# Patient Record
Sex: FEMALE | Race: ASIAN | NOT HISPANIC OR LATINO
[De-identification: names, ages, dates, MRNs, and addresses within clinical notes are randomized per-mention and may not be internally consistent; named-entity substitution may affect disease eponyms.]

---

## 2020-04-26 ENCOUNTER — MESSAGE (OUTPATIENT)
Age: 35
End: 2020-04-26

## 2020-05-02 LAB
SARS-COV-2 IGG SERPL IA-ACNC: 0.6 RATIO
SARS-COV-2 IGG SERPL QL IA: NEGATIVE

## 2021-06-07 ENCOUNTER — EMERGENCY (EMERGENCY)
Facility: HOSPITAL | Age: 36
LOS: 0 days | Discharge: HOME | End: 2021-06-07
Attending: EMERGENCY MEDICINE
Payer: COMMERCIAL

## 2021-06-07 VITALS
DIASTOLIC BLOOD PRESSURE: 85 MMHG | OXYGEN SATURATION: 100 % | SYSTOLIC BLOOD PRESSURE: 108 MMHG | TEMPERATURE: 98 F | HEART RATE: 71 BPM | RESPIRATION RATE: 18 BRPM

## 2021-06-07 VITALS
RESPIRATION RATE: 18 BRPM | HEART RATE: 73 BPM | SYSTOLIC BLOOD PRESSURE: 96 MMHG | DIASTOLIC BLOOD PRESSURE: 67 MMHG | TEMPERATURE: 98 F | OXYGEN SATURATION: 100 %

## 2021-06-07 DIAGNOSIS — R55 SYNCOPE AND COLLAPSE: ICD-10-CM

## 2021-06-07 DIAGNOSIS — R42 DIZZINESS AND GIDDINESS: ICD-10-CM

## 2021-06-07 LAB
ADD ON TEST-SPECIMEN IN LAB: SIGNIFICANT CHANGE UP
ALBUMIN SERPL ELPH-MCNC: 3.8 G/DL — SIGNIFICANT CHANGE UP (ref 3.3–5)
ALP SERPL-CCNC: 43 U/L — SIGNIFICANT CHANGE UP (ref 40–120)
ALT FLD-CCNC: 15 U/L — SIGNIFICANT CHANGE UP (ref 12–78)
ANION GAP SERPL CALC-SCNC: 5 MMOL/L — SIGNIFICANT CHANGE UP (ref 5–17)
ANISOCYTOSIS BLD QL: SLIGHT — SIGNIFICANT CHANGE UP
AST SERPL-CCNC: 13 U/L — LOW (ref 15–37)
BASOPHILS # BLD AUTO: 0.03 K/UL — SIGNIFICANT CHANGE UP (ref 0–0.2)
BASOPHILS NFR BLD AUTO: 0.6 % — SIGNIFICANT CHANGE UP (ref 0–2)
BILIRUB SERPL-MCNC: 0.7 MG/DL — SIGNIFICANT CHANGE UP (ref 0.2–1.2)
BUN SERPL-MCNC: 9 MG/DL — SIGNIFICANT CHANGE UP (ref 7–23)
CALCIUM SERPL-MCNC: 8.5 MG/DL — SIGNIFICANT CHANGE UP (ref 8.5–10.1)
CHLORIDE SERPL-SCNC: 108 MMOL/L — SIGNIFICANT CHANGE UP (ref 96–108)
CO2 SERPL-SCNC: 27 MMOL/L — SIGNIFICANT CHANGE UP (ref 22–31)
CREAT SERPL-MCNC: 0.6 MG/DL — SIGNIFICANT CHANGE UP (ref 0.5–1.3)
ELLIPTOCYTES BLD QL SMEAR: SLIGHT — SIGNIFICANT CHANGE UP
EOSINOPHIL # BLD AUTO: 0.05 K/UL — SIGNIFICANT CHANGE UP (ref 0–0.5)
EOSINOPHIL NFR BLD AUTO: 1 % — SIGNIFICANT CHANGE UP (ref 0–6)
GLUCOSE SERPL-MCNC: 138 MG/DL — HIGH (ref 70–99)
HCG SERPL-ACNC: <1 MIU/ML — SIGNIFICANT CHANGE UP
HCT VFR BLD CALC: 37.2 % — SIGNIFICANT CHANGE UP (ref 34.5–45)
HGB BLD-MCNC: 11.2 G/DL — LOW (ref 11.5–15.5)
IMM GRANULOCYTES NFR BLD AUTO: 0.4 % — SIGNIFICANT CHANGE UP (ref 0–1.5)
LYMPHOCYTES # BLD AUTO: 1.81 K/UL — SIGNIFICANT CHANGE UP (ref 1–3.3)
LYMPHOCYTES # BLD AUTO: 35.3 % — SIGNIFICANT CHANGE UP (ref 13–44)
MANUAL SMEAR VERIFICATION: SIGNIFICANT CHANGE UP
MCHC RBC-ENTMCNC: 20.7 PG — LOW (ref 27–34)
MCHC RBC-ENTMCNC: 30.1 GM/DL — LOW (ref 32–36)
MCV RBC AUTO: 68.6 FL — LOW (ref 80–100)
MICROCYTES BLD QL: SIGNIFICANT CHANGE UP
MONOCYTES # BLD AUTO: 0.27 K/UL — SIGNIFICANT CHANGE UP (ref 0–0.9)
MONOCYTES NFR BLD AUTO: 5.3 % — SIGNIFICANT CHANGE UP (ref 2–14)
NEUTROPHILS # BLD AUTO: 2.95 K/UL — SIGNIFICANT CHANGE UP (ref 1.8–7.4)
NEUTROPHILS NFR BLD AUTO: 57.4 % — SIGNIFICANT CHANGE UP (ref 43–77)
OVALOCYTES BLD QL SMEAR: SLIGHT — SIGNIFICANT CHANGE UP
PLAT MORPH BLD: NORMAL — SIGNIFICANT CHANGE UP
PLATELET # BLD AUTO: 219 K/UL — SIGNIFICANT CHANGE UP (ref 150–400)
POIKILOCYTOSIS BLD QL AUTO: SLIGHT — SIGNIFICANT CHANGE UP
POLYCHROMASIA BLD QL SMEAR: SLIGHT — SIGNIFICANT CHANGE UP
POTASSIUM SERPL-MCNC: 3.9 MMOL/L — SIGNIFICANT CHANGE UP (ref 3.5–5.3)
POTASSIUM SERPL-SCNC: 3.9 MMOL/L — SIGNIFICANT CHANGE UP (ref 3.5–5.3)
PROT SERPL-MCNC: 6.9 GM/DL — SIGNIFICANT CHANGE UP (ref 6–8.3)
RBC # BLD: 5.42 M/UL — HIGH (ref 3.8–5.2)
RBC # FLD: 15 % — HIGH (ref 10.3–14.5)
RBC BLD AUTO: ABNORMAL
SCHISTOCYTES BLD QL AUTO: SLIGHT — SIGNIFICANT CHANGE UP
SODIUM SERPL-SCNC: 140 MMOL/L — SIGNIFICANT CHANGE UP (ref 135–145)
TARGETS BLD QL SMEAR: SLIGHT — SIGNIFICANT CHANGE UP
WBC # BLD: 5.13 K/UL — SIGNIFICANT CHANGE UP (ref 3.8–10.5)
WBC # FLD AUTO: 5.13 K/UL — SIGNIFICANT CHANGE UP (ref 3.8–10.5)

## 2021-06-07 PROCEDURE — 36415 COLL VENOUS BLD VENIPUNCTURE: CPT

## 2021-06-07 PROCEDURE — 80053 COMPREHEN METABOLIC PANEL: CPT

## 2021-06-07 PROCEDURE — 93010 ELECTROCARDIOGRAM REPORT: CPT

## 2021-06-07 PROCEDURE — 84702 CHORIONIC GONADOTROPIN TEST: CPT

## 2021-06-07 PROCEDURE — 85025 COMPLETE CBC W/AUTO DIFF WBC: CPT

## 2021-06-07 PROCEDURE — 99283 EMERGENCY DEPT VISIT LOW MDM: CPT

## 2021-06-07 PROCEDURE — 99284 EMERGENCY DEPT VISIT MOD MDM: CPT

## 2021-06-07 PROCEDURE — 93005 ELECTROCARDIOGRAM TRACING: CPT

## 2021-06-07 NOTE — ED ADULT NURSE NOTE - OBJECTIVE STATEMENT
pt. c/o syncope s/p donating blood pt. states she did not eat lunch and when she started to feel dizzy after giving blood she tried to go back to go get food/snacks but must have passed out in hallway, +LOC, -head pain -neck pain. pt. denies blood thinners

## 2021-06-07 NOTE — ED PROVIDER NOTE - CLINICAL SUMMARY MEDICAL DECISION MAKING FREE TEXT BOX
Pts sx seem in line with vasovagal syncope. Non dangerous etiology. Ensure no evidence of arrythmia. Ensure no evidence of anemia. Pts sx seem in line with vasovagal syncope.  Had noxious stimuli and stereotyped prodrome.  Has had prior syncopal episodes.  Unsure if she hit her head, but is without pain to scalp and no visible e/o trauma.  Fall would have been from standing height in any event and [place] CTH and C spine rules recommend against imaging.  No e/o dangerous etiology.  Will ensure no evidence of arrythmia.

## 2021-06-07 NOTE — ED PROVIDER NOTE - OBJECTIVE STATEMENT
34 y/o F with no significant PMHx was  giving blood. Pt was drinking juice when she got up and passed out in hallway. Pt feels lightheaded. Denies headache, CP, SOB, fever or chills. Pt does states she has had prior syncopal episodes'.

## 2021-06-07 NOTE — ED PROVIDER NOTE - PATIENT PORTAL LINK FT
You can access the FollowMyHealth Patient Portal offered by [facility] by registering at the following website: [email or web address]. By joining [employer]’s FollowMyHealth portal, you will also be able to view your health information using other applications (apps) compatible with our system.

## 2021-06-07 NOTE — ED ADULT NURSE NOTE - NSIMPLEMENTINTERV_GEN_ALL_ED
Implemented All Universal Safety Interventions:  [place] to call system. Call bell, personal items and telephone within reach. Instruct patient to call for assistance. Room bathroom lighting operational. Non-slip footwear when patient is off stretcher. Physically safe environment: no spills, clutter or unnecessary equipment. Stretcher in lowest position, wheels locked, appropriate side rails in place.

## 2021-06-07 NOTE — ED PROVIDER NOTE - PROGRESS NOTE DETAILS
Labs unremarkable.  EKG unremarkable.  Pt well appearing.  Not pregnant.  Stable for d/c home.  D/c home with strict return precautions and prompt outpatient f/u.

## 2021-06-07 NOTE — ED ADULT TRIAGE NOTE - CHIEF COMPLAINT QUOTE
pt a/ox3, reports "donating blood at blood [place] and having a syncopal episode while walking in the hallway s/p blood draw". +HS +LOC. pt denies blood thinners.

## 2021-11-28 NOTE — ED ADULT TRIAGE NOTE - DIRECT TO ROOM CARE INITIATED:
07-Jun-2021 13:07
[facility] ([facility])  Rehabilitation  [facility] [address] [place] [address]  Phone: [phone]  Fax:

## 2022-08-02 ENCOUNTER — NON-APPOINTMENT (OUTPATIENT)
Age: 37
End: 2022-08-02

## 2022-09-30 PROBLEM — Z78.9 OTHER SPECIFIED HEALTH STATUS: Chronic | Status: ACTIVE | Noted: 2021-06-07

## 2022-10-17 ENCOUNTER — NON-APPOINTMENT (OUTPATIENT)
Age: 37
End: 2022-10-17

## 2022-10-17 ENCOUNTER — APPOINTMENT (OUTPATIENT)
Dept: INTERNAL MEDICINE | Facility: CLINIC | Age: 37
End: 2022-10-17

## 2022-10-17 VITALS
TEMPERATURE: 97.3 F | OXYGEN SATURATION: 97 % | BODY MASS INDEX: 20.2 KG/M2 | SYSTOLIC BLOOD PRESSURE: 110 MMHG | RESPIRATION RATE: 14 BRPM | DIASTOLIC BLOOD PRESSURE: 80 MMHG | HEART RATE: 67 BPM | WEIGHT: 114 LBS | HEIGHT: 63 IN

## 2022-10-17 DIAGNOSIS — Z80.3 FAMILY HISTORY OF MALIGNANT NEOPLASM OF BREAST: ICD-10-CM

## 2022-10-17 DIAGNOSIS — Z82.49 FAMILY HISTORY OF ISCHEMIC HEART DISEASE AND OTHER DISEASES OF THE CIRCULATORY SYSTEM: ICD-10-CM

## 2022-10-17 DIAGNOSIS — Z78.9 OTHER SPECIFIED HEALTH STATUS: ICD-10-CM

## 2022-10-17 PROCEDURE — 99385 PREV VISIT NEW AGE 18-39: CPT

## 2022-10-17 NOTE — PLAN
[FreeTextEntry1] : Health Maintenance\par - will obtain labwork CBC, CMP, lipid panel, thyroid, vitamin B12, D, folate\par - patient has not had previous pap smear\par - patient will see gynecologist\par - follows with dentist regularly\par - received flu vaccine, COVID vaccines x3 \par \par

## 2022-10-17 NOTE — HISTORY OF PRESENT ILLNESS
[FreeTextEntry1] : New patient physical exam [de-identified] : The patient is a 38 y/o F with no significant past medical history who presents to establish care and for an annual physical exam. The patient states she and her  are trying to conceive a child, does not follow with gyn. Has been trying to conceive x 3 months. Taking prenatal vitamin and fiber supplements. No previous pap smears. \par LNMP 2 weeks ago, normal per patient.

## 2022-10-17 NOTE — HEALTH RISK ASSESSMENT
[Very Good] : ~his/her~  mood as very good [Never] : Never [Yes] : Yes [Monthly or less (1 pt)] : Monthly or less (1 point) [1 or 2 (0 pts)] : 1 or 2 (0 points) [Never (0 pts)] : Never (0 points) [0] : 2) Feeling down, depressed, or hopeless: Not at all (0) [PHQ-2 Negative - No further assessment needed] : PHQ-2 Negative - No further assessment needed [With Significant Other] : lives with significant other [Employed] : employed [Feels Safe at Home] : Feels safe at home [Fully functional (bathing, dressing, toileting, transferring, walking, feeding)] : Fully functional (bathing, dressing, toileting, transferring, walking, feeding) [Fully functional (using the telephone, shopping, preparing meals, housekeeping, doing laundry, using] : Fully functional and needs no help or supervision to perform IADLs (using the telephone, shopping, preparing meals, housekeeping, doing laundry, using transportation, managing medications and managing finances) [[id number]] : 0 [Reports changes in hearing] : Reports no changes in hearing [Reports changes in vision] : Reports no changes in vision [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

## 2022-10-18 ENCOUNTER — NON-APPOINTMENT (OUTPATIENT)
Age: 37
End: 2022-10-18

## 2022-10-18 ENCOUNTER — TRANSCRIPTION ENCOUNTER (OUTPATIENT)
Age: 37
End: 2022-10-18

## 2022-10-21 LAB
25(OH)D3 SERPL-MCNC: 20.8 NG/ML
ALBUMIN SERPL ELPH-MCNC: 4.5 G/DL
ALP BLD-CCNC: 61 U/L
ALT SERPL-CCNC: 11 U/L
ANION GAP SERPL CALC-SCNC: 12 MMOL/L
AST SERPL-CCNC: 13 U/L
BASOPHILS # BLD AUTO: 0.05 K/UL
BASOPHILS NFR BLD AUTO: 0.9 %
BILIRUB SERPL-MCNC: 0.4 MG/DL
BUN SERPL-MCNC: 11 MG/DL
CALCIUM SERPL-MCNC: 9.3 MG/DL
CHLORIDE SERPL-SCNC: 103 MMOL/L
CHOLEST SERPL-MCNC: 152 MG/DL
CO2 SERPL-SCNC: 26 MMOL/L
CREAT SERPL-MCNC: 0.66 MG/DL
EGFR: 117 ML/MIN/1.73M2
EOSINOPHIL # BLD AUTO: 0.1 K/UL
EOSINOPHIL NFR BLD AUTO: 1.9 %
ESTIMATED AVERAGE GLUCOSE: 114 MG/DL
FOLATE SERPL-MCNC: 11.5 NG/ML
GLUCOSE SERPL-MCNC: 83 MG/DL
HBA1C MFR BLD HPLC: 5.6 %
HCT VFR BLD CALC: 40.3 %
HDLC SERPL-MCNC: 77 MG/DL
HGB BLD-MCNC: 12.2 G/DL
IMM GRANULOCYTES NFR BLD AUTO: 0.2 %
LDLC SERPL CALC-MCNC: 67 MG/DL
LYMPHOCYTES # BLD AUTO: 1.64 K/UL
LYMPHOCYTES NFR BLD AUTO: 30.6 %
MAN DIFF?: NORMAL
MCHC RBC-ENTMCNC: 21.2 PG
MCHC RBC-ENTMCNC: 30.3 GM/DL
MCV RBC AUTO: 70.1 FL
MONOCYTES # BLD AUTO: 0.38 K/UL
MONOCYTES NFR BLD AUTO: 7.1 %
NEUTROPHILS # BLD AUTO: 3.18 K/UL
NEUTROPHILS NFR BLD AUTO: 59.3 %
NONHDLC SERPL-MCNC: 75 MG/DL
PLATELET # BLD AUTO: 315 K/UL
POTASSIUM SERPL-SCNC: 4.1 MMOL/L
PROT SERPL-MCNC: 7.1 G/DL
RBC # BLD: 5.75 M/UL
RBC # FLD: 16.1 %
SODIUM SERPL-SCNC: 141 MMOL/L
TRIGL SERPL-MCNC: 43 MG/DL
TSH SERPL-ACNC: 1.13 UIU/ML
VIT B12 SERPL-MCNC: 627 PG/ML
WBC # FLD AUTO: 5.36 K/UL

## 2022-12-07 ENCOUNTER — APPOINTMENT (OUTPATIENT)
Dept: OBGYN | Facility: CLINIC | Age: 37
End: 2022-12-07

## 2022-12-13 ENCOUNTER — APPOINTMENT (OUTPATIENT)
Dept: OBGYN | Facility: CLINIC | Age: 37
End: 2022-12-13

## 2022-12-13 PROCEDURE — 81025 URINE PREGNANCY TEST: CPT

## 2022-12-13 PROCEDURE — 81002 URINALYSIS NONAUTO W/O SCOPE: CPT

## 2022-12-13 PROCEDURE — 36415 COLL VENOUS BLD VENIPUNCTURE: CPT

## 2022-12-13 PROCEDURE — 76856 US EXAM PELVIC COMPLETE: CPT | Mod: 59

## 2022-12-13 PROCEDURE — 99385 PREV VISIT NEW AGE 18-39: CPT | Mod: 25

## 2022-12-13 PROCEDURE — 76830 TRANSVAGINAL US NON-OB: CPT

## 2022-12-29 ENCOUNTER — NON-APPOINTMENT (OUTPATIENT)
Age: 37
End: 2022-12-29

## 2023-01-03 ENCOUNTER — APPOINTMENT (OUTPATIENT)
Dept: OBGYN | Facility: CLINIC | Age: 38
End: 2023-01-03
Payer: COMMERCIAL

## 2023-01-03 PROCEDURE — 76830 TRANSVAGINAL US NON-OB: CPT

## 2023-01-03 PROCEDURE — 81025 URINE PREGNANCY TEST: CPT

## 2023-01-03 PROCEDURE — 99214 OFFICE O/P EST MOD 30 MIN: CPT

## 2023-01-03 PROCEDURE — 81002 URINALYSIS NONAUTO W/O SCOPE: CPT

## 2023-01-03 PROCEDURE — 36415 COLL VENOUS BLD VENIPUNCTURE: CPT

## 2023-01-20 ENCOUNTER — APPOINTMENT (OUTPATIENT)
Dept: OBGYN | Facility: CLINIC | Age: 38
End: 2023-01-20

## 2023-01-25 ENCOUNTER — EMERGENCY (EMERGENCY)
Facility: HOSPITAL | Age: 38
LOS: 0 days | Discharge: HOME | End: 2023-01-25
Attending: STUDENT IN AN ORGANIZED HEALTH CARE EDUCATION/TRAINING PROGRAM
Payer: COMMERCIAL

## 2023-01-25 ENCOUNTER — APPOINTMENT (OUTPATIENT)
Dept: ANTEPARTUM | Facility: CLINIC | Age: 38
End: 2023-01-25
Payer: COMMERCIAL

## 2023-01-25 ENCOUNTER — ASOB RESULT (OUTPATIENT)
Age: 38
End: 2023-01-25

## 2023-01-25 VITALS — HEIGHT: 63 IN | WEIGHT: 113.98 LBS

## 2023-01-25 VITALS
HEART RATE: 84 BPM | RESPIRATION RATE: 15 BRPM | TEMPERATURE: 98 F | SYSTOLIC BLOOD PRESSURE: 126 MMHG | DIASTOLIC BLOOD PRESSURE: 85 MMHG | OXYGEN SATURATION: 99 %

## 2023-01-25 LAB
ALBUMIN SERPL ELPH-MCNC: 3.5 G/DL — SIGNIFICANT CHANGE UP (ref 3.3–5)
ALP SERPL-CCNC: 42 U/L — SIGNIFICANT CHANGE UP (ref 40–120)
ALT FLD-CCNC: 14 U/L — SIGNIFICANT CHANGE UP (ref 12–78)
ANION GAP SERPL CALC-SCNC: 5 MMOL/L — SIGNIFICANT CHANGE UP (ref 5–17)
APPEARANCE UR: CLEAR — SIGNIFICANT CHANGE UP
AST SERPL-CCNC: 14 U/L — LOW (ref 15–37)
BASOPHILS # BLD AUTO: 0.03 K/UL — SIGNIFICANT CHANGE UP (ref 0–0.2)
BASOPHILS NFR BLD AUTO: 0.4 % — SIGNIFICANT CHANGE UP (ref 0–2)
BILIRUB SERPL-MCNC: 0.5 MG/DL — SIGNIFICANT CHANGE UP (ref 0.2–1.2)
BILIRUB UR-MCNC: NEGATIVE — SIGNIFICANT CHANGE UP
BUN SERPL-MCNC: 11 MG/DL — SIGNIFICANT CHANGE UP (ref 7–23)
CALCIUM SERPL-MCNC: 9.1 MG/DL — SIGNIFICANT CHANGE UP (ref 8.5–10.1)
CHLORIDE SERPL-SCNC: 107 MMOL/L — SIGNIFICANT CHANGE UP (ref 96–108)
CO2 SERPL-SCNC: 24 MMOL/L — SIGNIFICANT CHANGE UP (ref 22–31)
COLOR SPEC: YELLOW — SIGNIFICANT CHANGE UP
CREAT SERPL-MCNC: 0.69 MG/DL — SIGNIFICANT CHANGE UP (ref 0.5–1.3)
DIFF PNL FLD: ABNORMAL
EGFR: 116 ML/MIN/1.73M2 — SIGNIFICANT CHANGE UP
EOSINOPHIL # BLD AUTO: 0.03 K/UL — SIGNIFICANT CHANGE UP (ref 0–0.5)
EOSINOPHIL NFR BLD AUTO: 0.4 % — SIGNIFICANT CHANGE UP (ref 0–6)
GLUCOSE SERPL-MCNC: 95 MG/DL — SIGNIFICANT CHANGE UP (ref 70–99)
GLUCOSE UR QL: NEGATIVE — SIGNIFICANT CHANGE UP
HCG SERPL-ACNC: HIGH MIU/ML
HCT VFR BLD CALC: 38.1 % — SIGNIFICANT CHANGE UP (ref 34.5–45)
HGB BLD-MCNC: 12.3 G/DL — SIGNIFICANT CHANGE UP (ref 11.5–15.5)
IMM GRANULOCYTES NFR BLD AUTO: 0.1 % — SIGNIFICANT CHANGE UP (ref 0–0.9)
KETONES UR-MCNC: NEGATIVE — SIGNIFICANT CHANGE UP
LEUKOCYTE ESTERASE UR-ACNC: ABNORMAL
LYMPHOCYTES # BLD AUTO: 1.33 K/UL — SIGNIFICANT CHANGE UP (ref 1–3.3)
LYMPHOCYTES # BLD AUTO: 19.2 % — SIGNIFICANT CHANGE UP (ref 13–44)
MCHC RBC-ENTMCNC: 21.7 PG — LOW (ref 27–34)
MCHC RBC-ENTMCNC: 32.3 GM/DL — SIGNIFICANT CHANGE UP (ref 32–36)
MCV RBC AUTO: 67.1 FL — LOW (ref 80–100)
MONOCYTES # BLD AUTO: 0.46 K/UL — SIGNIFICANT CHANGE UP (ref 0–0.9)
MONOCYTES NFR BLD AUTO: 6.7 % — SIGNIFICANT CHANGE UP (ref 2–14)
NEUTROPHILS # BLD AUTO: 5.05 K/UL — SIGNIFICANT CHANGE UP (ref 1.8–7.4)
NEUTROPHILS NFR BLD AUTO: 73.2 % — SIGNIFICANT CHANGE UP (ref 43–77)
NITRITE UR-MCNC: NEGATIVE — SIGNIFICANT CHANGE UP
PH UR: 6.5 — SIGNIFICANT CHANGE UP (ref 5–8)
PLATELET # BLD AUTO: 241 K/UL — SIGNIFICANT CHANGE UP (ref 150–400)
POTASSIUM SERPL-MCNC: 3.5 MMOL/L — SIGNIFICANT CHANGE UP (ref 3.5–5.3)
POTASSIUM SERPL-SCNC: 3.5 MMOL/L — SIGNIFICANT CHANGE UP (ref 3.5–5.3)
PROT SERPL-MCNC: 7.4 GM/DL — SIGNIFICANT CHANGE UP (ref 6–8.3)
PROT UR-MCNC: NEGATIVE — SIGNIFICANT CHANGE UP
RBC # BLD: 5.68 M/UL — HIGH (ref 3.8–5.2)
RBC # FLD: 15.6 % — HIGH (ref 10.3–14.5)
SODIUM SERPL-SCNC: 136 MMOL/L — SIGNIFICANT CHANGE UP (ref 135–145)
SP GR SPEC: 1.01 — SIGNIFICANT CHANGE UP (ref 1.01–1.02)
UROBILINOGEN FLD QL: NEGATIVE — SIGNIFICANT CHANGE UP
WBC # BLD: 6.91 K/UL — SIGNIFICANT CHANGE UP (ref 3.8–10.5)
WBC # FLD AUTO: 6.91 K/UL — SIGNIFICANT CHANGE UP (ref 3.8–10.5)

## 2023-01-25 PROCEDURE — 86850 RBC ANTIBODY SCREEN: CPT

## 2023-01-25 PROCEDURE — 76802 OB US < 14 WKS ADDL FETUS: CPT

## 2023-01-25 PROCEDURE — 99213 OFFICE O/P EST LOW 20 MIN: CPT

## 2023-01-25 PROCEDURE — 99284 EMERGENCY DEPT VISIT MOD MDM: CPT

## 2023-01-25 PROCEDURE — 86900 BLOOD TYPING SEROLOGIC ABO: CPT

## 2023-01-25 PROCEDURE — 36415 COLL VENOUS BLD VENIPUNCTURE: CPT

## 2023-01-25 PROCEDURE — 0502F SUBSEQUENT PRENATAL CARE: CPT

## 2023-01-25 PROCEDURE — 80053 COMPREHEN METABOLIC PANEL: CPT

## 2023-01-25 PROCEDURE — 86901 BLOOD TYPING SEROLOGIC RH(D): CPT

## 2023-01-25 PROCEDURE — 81002 URINALYSIS NONAUTO W/O SCOPE: CPT

## 2023-01-25 PROCEDURE — 85025 COMPLETE CBC W/AUTO DIFF WBC: CPT

## 2023-01-25 PROCEDURE — 76801 OB US < 14 WKS SINGLE FETUS: CPT | Mod: 26

## 2023-01-25 PROCEDURE — 87086 URINE CULTURE/COLONY COUNT: CPT

## 2023-01-25 PROCEDURE — 76813 OB US NUCHAL MEAS 1 GEST: CPT

## 2023-01-25 PROCEDURE — 76801 OB US < 14 WKS SINGLE FETUS: CPT

## 2023-01-25 PROCEDURE — 99284 EMERGENCY DEPT VISIT MOD MDM: CPT | Mod: 25

## 2023-01-25 PROCEDURE — 81001 URINALYSIS AUTO W/SCOPE: CPT

## 2023-01-25 PROCEDURE — 84702 CHORIONIC GONADOTROPIN TEST: CPT

## 2023-01-25 NOTE — ED ADULT TRIAGE NOTE - ESI TRIAGE ACUITY LEVEL, MLM
3

## 2023-01-25 NOTE — ED PROVIDER NOTE - OBJECTIVE STATEMENT
37-year-old female G1, P0 presents the ED with vaginal bleeding that started this morning.  Patient estimates approximately 30 cc of blood came out with no clots.  Denied abdominal pain or cramping.  Was in a normal state of health prior no abdominal trauma no fevers no chills.  Patient had ultrasound with her OB/GYN doctor last week which showed normal pregnancy with normal heartbeat.  Pregnancy was in the correct location.  She is due for her 12-week scan today.  Denies past medical history.  Took MiraLAX yesterday.

## 2023-01-25 NOTE — ED PROVIDER NOTE - CLINICAL SUMMARY MEDICAL DECISION MAKING FREE TEXT BOX
Adult female first pregnancy 12 weeks and with known pregnancy in the correct location based on previous ultrasound presenting with 1 day of vaginal bleeding.  Vitals are normal.  No abdominal pain.  On exam well-appearing.  Plan for transvaginal ultrasound type and screen H&H check and reassessment.  Anticipate discharge with OB/GYN follow-up depending on results of ultrasound and blood work.

## 2023-01-25 NOTE — ED ADULT NURSE NOTE - CHIEF COMPLAINT QUOTE
Pt presents to the ED c/o vaginal bleeding x15 minutes. Pt is , 12 weeks pregnant. Denies abdominal cramping. OBGYJOSUE Rosenberg at [place].

## 2023-01-25 NOTE — ED ADULT NURSE REASSESSMENT NOTE - NS ED NURSE REASSESS COMMENT FT1
Pt is A&O4, pt able to ambulate safely and steadily w/out assistance, denies dizziness/weakness upon standing, pt discharged home and paperwork was signed, reviewed with patient. Follow up treatment and plan for discharge was reviewed with the patient. Vital Signs recorded in the EMR. Pt education deemed successful at time of discharge after teach back proves proficiency. Pt has no distress at time of discharge, pt provided discharge instruction paperwork.

## 2023-01-25 NOTE — ED PROVIDER NOTE - PATIENT PORTAL LINK FT
You can access the FollowMyHealth Patient Portal offered by [facility] by registering at the following website: [email or web address]. By joining [employer]’s FollowMyHealth portal, you will also be able to view your health information using other applications (apps) compatible with our system.

## 2023-01-25 NOTE — ED ADULT TRIAGE NOTE - CHIEF COMPLAINT QUOTE
Pt presents to the ED c/o vaginal bleeding x15 minutes. Pt is , 12 weeks pregnant. Denies abdominal cramping. OBGYJOSUE Rosenberg at [place].

## 2023-01-25 NOTE — ED ADULT NURSE NOTE - OBJECTIVE STATEMENT
Pt is a 37YOF who is here for vaginal bleeding, pt states she was at work when she started to feel bleeding and when she looked it was approximately 20-30cc of bright red blood, pt states she is 12 weeks pregnant, pt is A0, pt states she has no pain, discomfort, pt has no nausea, vomiting, no changes to her bowel or elimination patterns, pt has no cramping, fevers, chills, pt denies any dizziness, shortness of breath, difficulty breathing, chest pain. Pt states pregnancy has been normal up until today.

## 2023-01-25 NOTE — ED PROVIDER NOTE - NSFOLLOWUPINSTRUCTIONS_ED_ALL_ED_FT
Seek immediate medical assistance for any new or worsening symptoms. If you have issues obtaining follow up, please call: [phone] or [phone]  to obtain a doctor or specialist who takes your insurance in your area.       Follow-up with your OB/GYN doctor.    Threatened Miscarriage    WHAT YOU NEED TO KNOW:  A threatened miscarriage occurs when you have vaginal bleeding within the first 20 weeks of pregnancy. It means that a miscarriage may happen. A threatened miscarriage may also be called a threatened .  DISCHARGE INSTRUCTIONS:  Return to the emergency department if:  You feel weak or faint.  Your pain or cramping in your abdomen or back gets worse.  You have vaginal bleeding that soaks 1 or more pads in an hour.  You pass material that looks like tissue or large clots.  Contact your healthcare provider or obstetrician if:  You have a fever.  You have trouble urinating, burning when you urinate, or feel a need to urinate often.  You have new or worsening vaginal bleeding.  You have vaginal pain or itching, or vaginal discharge that is yellow, green, or foul-smelling.  You have questions or concerns about your condition or care.  Self-care: The following may help you manage your symptoms and decrease your risk for a miscarriage:  Do not put anything in your vagina. Do not have sex, douche, or use tampons. These actions may increase your risk for infection and miscarriage.  Rest as directed. Do not exercise or do strenuous activities. These activities may cause  labor or miscarriage. Ask your healthcare provider what activities are okay to do.  Stay healthy during pregnancy:  Eat a variety of healthy foods. Healthy foods can help you get extra protein, water, and calories that you need while you are pregnant. Healthy foods include fruits, vegetables, whole-grain breads, low-fat dairy products, beans, lean meats, and fish. Avoid raw or undercooked meat and fish. Ask your healthcare provider if you need a special diet.  Take prenatal vitamins as directed. These help you get the right amount of vitamins and minerals. They may also decrease the risk of certain birth defects.  Do not drink alcohol or use illegal drugs. These can increase your risk for a miscarriage or harm your baby.  Do not smoke. Nicotine and other chemicals in cigarettes and cigars can harm your baby and cause miscarriage or  labor. Ask your healthcare provider for information if you currently smoke and need help to quit. E-cigarettes or smokeless tobacco still contain nicotine. Do not use these products.  Decrease your risk for an infection. Always wash your hands before eating or preparing meals. Do not spend time with people who are sick. Ask your healthcare provider if you need immunizations such as the flu or hepatitis B vaccine. Immunizations may decrease your risk for infections that could cause a miscarriage.  Manage your medical conditions. Keep your blood pressure and blood sugars under control. Maintain a healthy weight during pregnancy.  Follow up with your obstetrician as directed: You may need to see your obstetrician frequently for ultrasounds or blood tests. Write down your questions so you remember to ask them during your visits.    Amenaza de aborto natural    LO QUE NECESITA SABER:    Joaquim amenaza de aborto ocurre cuando se tiene sangrado vaginal dentro de las primeras 20 semanas de embarazo. Eso indica que podría ocurrir un aborto natural. Joaquim amenaza de un aborto natural también se le conoce london joaquim amenaza de pérdida del embarazo.    INSTRUCCIONES SOBRE EL BLANCA HOSPITALARIA:  Busque atención médica de inmediato si:  Se siente débil o que se desmaya.  Tiene dolor o cólicos en el abdomen o en la espalda que empeoran.  Tiene sangrado vaginal que en joaquim hora empapa por lo menos 1 toalla sanitaria.  Le sale un material que parece tejido o coágulos grandes.  Comuníquese con das médico u obstetra si:  Tiene fiebre.  Tiene problemas para orinar, siente ardor o la necesidad de orinar con frecuencia.  Tiene sangrado vaginal nuevo o que empeora.  Tiene dolor o comezón vaginal o flujo vaginal de color amarillo o satnam o maloliente.  Usted tiene preguntas o inquietudes acerca de das condición o cuidado.  Cuidados personales:Los siguientes podrían ayudar a controlar los síntomas y disminuir das riesgo de un aborto natural:  No se ponga nada dentro de das vagina.No tenga relaciones sexuales, no tome duchas vaginales ni use tampones. Estas acciones pueden aumentar das riesgo de joaquim infección o de un aborto natural.  Repose según le indicaron.No practique ningún ejercicio ni actividades vigorosas. Estas actividades pueden causar un parto prematuro o un aborto natural. Pregunte a das médico cuáles actividades físicas son las apropiadas para usted.  Manténgase saludable idris el embarazo:  Consuma alimentos saludables y variados.Los alimentos sanos pueden ayudarla a obtener las proteínas y calorías adicionales que usted necesita idris el embarazo. Los alimentos saludables incluyen frutas, verduras, pan integral, productos lácteos bajos en grasa, frijoles, johana magras y pescado. Evite la carne y pescado crudos o que no estén cocinados completamente. Consulte con das médico en abhi que sea necesario que siga joaquim dieta especial.  Camas vitaminas prenatales según las indicaciones.Estas ayudan a obtener la cantidad correcta de vitaminas y minerales. También podrían ayudar a disminuir el riesgo de ciertos defectos de nacimiento.  No consuma alcohol ni drogas ilegales.Estos pueden aumentar el riesgo de un aborto espontáneo o perjudicar al bebé.  No fume.La nicotina y otros químicos en los cigarrillos y cigarros pueden perjudicar a das bebé y provocar un aborto natural o un parto prematuro. Pida información a das médico si usted actualmente fuma y necesita ayuda para dejar de fumar. Los cigarrillos electrónicos o el tabaco sin humo igualmente contienen nicotina. No use estos productos.  Disminuya el riesgo de joaquim infección.Siempre lave mariaelena maira antes de comer o preparar alimentos. No pase tiempo con gente que está enferma. Pregúntele a das médico si necesita vacunas london la vacuna contra la gripe o la hepatitis B. Las vacunas pueden disminuir das riesgo de contraer infecciones que pueden causar un aborto espontáneo.  Controle mariaelena afecciones médicas.Mantenga bajo control das presión arterial y azúcar en la vance. Mantenga un peso saludable idris el embarazo.  Programe joaquim lacey con das obstétrico london se le indique:Es posible que usted deba acudir a consulta con das ginecólogo frecuentemente para que le ordene ecografías o más exámenes de vance. Anote mariaelena preguntas para que se acuerde de hacerlas idris mariaelena visitas. Seek immediate medical assistance for any new or worsening symptoms. If you have issues obtaining follow up, please call: [phone] or [phone]  to obtain a doctor or specialist who takes your insurance in your area.     Follow-up with your OB/GYN doctor.    Threatened Miscarriage    WHAT YOU NEED TO KNOW:  A threatened miscarriage occurs when you have vaginal bleeding within the first 20 weeks of pregnancy. It means that a miscarriage may happen. A threatened miscarriage may also be called a threatened .  DISCHARGE INSTRUCTIONS:  Return to the emergency department if:  You feel weak or faint.  Your pain or cramping in your abdomen or back gets worse.  You have vaginal bleeding that soaks 1 or more pads in an hour.  You pass material that looks like tissue or large clots.  Contact your healthcare provider or obstetrician if:  You have a fever.  You have trouble urinating, burning when you urinate, or feel a need to urinate often.  You have new or worsening vaginal bleeding.  You have vaginal pain or itching, or vaginal discharge that is yellow, green, or foul-smelling.  You have questions or concerns about your condition or care.  Self-care: The following may help you manage your symptoms and decrease your risk for a miscarriage:  Do not put anything in your vagina. Do not have sex, douche, or use tampons. These actions may increase your risk for infection and miscarriage.  Rest as directed. Do not exercise or do strenuous activities. These activities may cause  labor or miscarriage. Ask your healthcare provider what activities are okay to do.  Stay healthy during pregnancy:  Eat a variety of healthy foods. Healthy foods can help you get extra protein, water, and calories that you need while you are pregnant. Healthy foods include fruits, vegetables, whole-grain breads, low-fat dairy products, beans, lean meats, and fish. Avoid raw or undercooked meat and fish. Ask your healthcare provider if you need a special diet.  Take prenatal vitamins as directed. These help you get the right amount of vitamins and minerals. They may also decrease the risk of certain birth defects.  Do not drink alcohol or use illegal drugs. These can increase your risk for a miscarriage or harm your baby.  Do not smoke. Nicotine and other chemicals in cigarettes and cigars can harm your baby and cause miscarriage or  labor. Ask your healthcare provider for information if you currently smoke and need help to quit. E-cigarettes or smokeless tobacco still contain nicotine. Do not use these products.  Decrease your risk for an infection. Always wash your hands before eating or preparing meals. Do not spend time with people who are sick. Ask your healthcare provider if you need immunizations such as the flu or hepatitis B vaccine. Immunizations may decrease your risk for infections that could cause a miscarriage.  Manage your medical conditions. Keep your blood pressure and blood sugars under control. Maintain a healthy weight during pregnancy.  Follow up with your obstetrician as directed: You may need to see your obstetrician frequently for ultrasounds or blood tests. Write down your questions so you remember to ask them during your visits.    Amenaza de aborto natural    LO QUE NECESITA SABER:    Joaquim amenaza de aborto ocurre cuando se tiene sangrado vaginal dentro de las primeras 20 semanas de embarazo. Eso indica que podría ocurrir un aborto natural. Joaquim amenaza de un aborto natural también se le conoce london joaquim amenaza de pérdida del embarazo.    INSTRUCCIONES SOBRE EL BLANCA HOSPITALARIA:  Busque atención médica de inmediato si:  Se siente débil o que se desmaya.  Tiene dolor o cólicos en el abdomen o en la espalda que empeoran.  Tiene sangrado vaginal que en joaquim hora empapa por lo menos 1 toalla sanitaria.  Le sale un material que parece tejido o coágulos grandes.  Comuníquese con das médico u obstetra si:  Tiene fiebre.  Tiene problemas para orinar, siente ardor o la necesidad de orinar con frecuencia.  Tiene sangrado vaginal nuevo o que empeora.  Tiene dolor o comezón vaginal o flujo vaginal de color amarillo o satnam o maloliente.  Usted tiene preguntas o inquietudes acerca de das condición o cuidado.  Cuidados personales:Los siguientes podrían ayudar a controlar los síntomas y disminuir das riesgo de un aborto natural:  No se ponga nada dentro de das vagina.No tenga relaciones sexuales, no tome duchas vaginales ni use tampones. Estas acciones pueden aumentar das riesgo de joaquim infección o de un aborto natural.  Repose según le indicaron.No practique ningún ejercicio ni actividades vigorosas. Estas actividades pueden causar un parto prematuro o un aborto natural. Pregunte a das médico cuáles actividades físicas son las apropiadas para usted.  Manténgase saludable idris el embarazo:  Consuma alimentos saludables y variados.Los alimentos sanos pueden ayudarla a obtener las proteínas y calorías adicionales que usted necesita idris el embarazo. Los alimentos saludables incluyen frutas, verduras, pan integral, productos lácteos bajos en grasa, frijoles, johana magras y pescado. Evite la carne y pescado crudos o que no estén cocinados completamente. Consulte con das médico en abhi que sea necesario que siga joaquim dieta especial.  Higden vitaminas prenatales según las indicaciones.Estas ayudan a obtener la cantidad correcta de vitaminas y minerales. También podrían ayudar a disminuir el riesgo de ciertos defectos de nacimiento.  No consuma alcohol ni drogas ilegales.Estos pueden aumentar el riesgo de un aborto espontáneo o perjudicar al bebé.  No fume.La nicotina y otros químicos en los cigarrillos y cigarros pueden perjudicar a das bebé y provocar un aborto natural o un parto prematuro. Pida información a das médico si usted actualmente fuma y necesita ayuda para dejar de fumar. Los cigarrillos electrónicos o el tabaco sin humo igualmente contienen nicotina. No use estos productos.  Disminuya el riesgo de joaquim infección.Siempre lave mariaelena maira antes de comer o preparar alimentos. No pase tiempo con gente que está enferma. Pregúntele a das médico si necesita vacunas london la vacuna contra la gripe o la hepatitis B. Las vacunas pueden disminuir das riesgo de contraer infecciones que pueden causar un aborto espontáneo.  Controle mariaelena afecciones médicas.Mantenga bajo control das presión arterial y azúcar en la vance. Mantenga un peso saludable idris el embarazo.  Programe joaquim lacey con das obstétrico london se le indique:Es posible que usted deba acudir a consulta con das ginecólogo frecuentemente para que le ordene ecografías o más exámenes de vance. Anote mariaelena preguntas para que se acuerde de hacerlas idris mariaelena visitas.

## 2023-01-25 NOTE — ED PROVIDER NOTE - IV ALTEPLASE INCLUSION HIDDEN
Xeljanz Counseling: I discussed with the patient the risks of Xeljanz therapy including increased risk of infection, liver issues, headache, diarrhea, or cold symptoms. Live vaccines should be avoided. They were instructed to call if they have any problems. show

## 2023-01-26 DIAGNOSIS — Z3A.12 12 WEEKS GESTATION OF PREGNANCY: ICD-10-CM

## 2023-01-26 DIAGNOSIS — O20.0 THREATENED ABORTION: ICD-10-CM

## 2023-01-26 DIAGNOSIS — N93.9 ABNORMAL UTERINE AND VAGINAL BLEEDING, UNSPECIFIED: ICD-10-CM

## 2023-01-27 LAB
CULTURE RESULTS: SIGNIFICANT CHANGE UP
SPECIMEN SOURCE: SIGNIFICANT CHANGE UP

## 2023-01-31 ENCOUNTER — APPOINTMENT (OUTPATIENT)
Dept: OBGYN | Facility: CLINIC | Age: 38
End: 2023-01-31
Payer: COMMERCIAL

## 2023-01-31 PROCEDURE — 0502F SUBSEQUENT PRENATAL CARE: CPT

## 2023-01-31 PROCEDURE — 81002 URINALYSIS NONAUTO W/O SCOPE: CPT

## 2023-01-31 PROCEDURE — 76801 OB US < 14 WKS SINGLE FETUS: CPT

## 2023-01-31 PROCEDURE — 99213 OFFICE O/P EST LOW 20 MIN: CPT

## 2023-02-09 ENCOUNTER — APPOINTMENT (OUTPATIENT)
Dept: OBGYN | Facility: CLINIC | Age: 38
End: 2023-02-09

## 2023-02-21 ENCOUNTER — APPOINTMENT (OUTPATIENT)
Dept: OBGYN | Facility: CLINIC | Age: 38
End: 2023-02-21
Payer: COMMERCIAL

## 2023-02-21 ENCOUNTER — APPOINTMENT (OUTPATIENT)
Dept: PEDIATRIC MEDICAL GENETICS | Facility: CLINIC | Age: 38
End: 2023-02-21
Payer: COMMERCIAL

## 2023-02-21 DIAGNOSIS — Z80.1 FAMILY HISTORY OF MALIGNANT NEOPLASM OF TRACHEA, BRONCHUS AND LUNG: ICD-10-CM

## 2023-02-21 DIAGNOSIS — O35.2XX0 MATERNAL CARE FOR (SUSPECTED) HEREDITARY DISEASE IN FETUS, NOT APPLICABLE OR UNSPECIFIED: ICD-10-CM

## 2023-02-21 DIAGNOSIS — Z15.89 GENETIC SUSCEPTIBILITY TO OTHER DISEASE: ICD-10-CM

## 2023-02-21 DIAGNOSIS — Z83.42 FAMILY HISTORY OF FAMILIAL HYPERCHOLESTEROLEMIA: ICD-10-CM

## 2023-02-21 PROCEDURE — 96040: CPT | Mod: 95

## 2023-02-21 PROCEDURE — 99213 OFFICE O/P EST LOW 20 MIN: CPT

## 2023-02-21 PROCEDURE — 36415 COLL VENOUS BLD VENIPUNCTURE: CPT

## 2023-02-21 PROCEDURE — 81002 URINALYSIS NONAUTO W/O SCOPE: CPT

## 2023-02-21 PROCEDURE — 0502F SUBSEQUENT PRENATAL CARE: CPT

## 2023-02-21 NOTE — HISTORY OF PRESENT ILLNESS
[Home] : at home, [unfilled] , at the time of the visit. [[facility] Office: ([facility]___] : at the medical office located in  [Spouse] : spouse [Verbal consent obtained from patient] : the patient, [unfilled]

## 2023-02-21 NOTE — HISTORY OF PRESENT ILLNESS
[Home] : at home, [unfilled] , at the time of the visit. [Medical Office: ([facility])___] : at the medical office located in  [Spouse] : spouse [Verbal consent obtained from patient] : the patient, [unfilled]

## 2023-03-01 ENCOUNTER — APPOINTMENT (OUTPATIENT)
Dept: OBGYN | Facility: CLINIC | Age: 38
End: 2023-03-01
Payer: COMMERCIAL

## 2023-03-01 VITALS
RESPIRATION RATE: 14 BRPM | DIASTOLIC BLOOD PRESSURE: 77 MMHG | WEIGHT: 118 LBS | HEIGHT: 63 IN | SYSTOLIC BLOOD PRESSURE: 118 MMHG | HEART RATE: 80 BPM | BODY MASS INDEX: 20.91 KG/M2 | OXYGEN SATURATION: 97 %

## 2023-03-01 DIAGNOSIS — Z13.1 ENCOUNTER FOR SCREENING FOR DIABETES MELLITUS: ICD-10-CM

## 2023-03-01 DIAGNOSIS — R82.90 UNSPECIFIED ABNORMAL FINDINGS IN URINE: ICD-10-CM

## 2023-03-01 DIAGNOSIS — D56.3 THALASSEMIA MINOR: ICD-10-CM

## 2023-03-01 PROCEDURE — 0500F INITIAL PRENATAL CARE VISIT: CPT

## 2023-03-01 PROCEDURE — 36415 COLL VENOUS BLD VENIPUNCTURE: CPT

## 2023-03-01 NOTE — PHYSICAL EXAM
[No Acute Distress] : no acute distress [Regular Rate Rhythm] : regular rate rhythm [Clear to Auscultation B/L] : clear to auscultation bilaterally

## 2023-03-06 PROBLEM — R82.90 CONTAMINATION OF URINE CULTURE: Status: ACTIVE | Noted: 2023-03-06

## 2023-03-06 LAB
25(OH)D3 SERPL-MCNC: 29.6 NG/ML
AFP MOM: 0.77
AFP VALUE: 39.5 NG/ML
ALPHA FETOPROTEIN SERUM COMMENT: NORMAL
ALPHA FETOPROTEIN SERUM INTERPRETATION: NORMAL
ALPHA FETOPROTEIN SERUM RESULTS: NORMAL
ALPHA FETOPROTEIN SERUM TEST RESULTS: NORMAL
BACTERIA UR CULT: ABNORMAL
C TRACH RRNA SPEC QL NAA+PROBE: NOT DETECTED
ESTIMATED AVERAGE GLUCOSE: 111 MG/DL
GESTATIONAL AGE BASED ON: NORMAL
GESTATIONAL AGE ON COLLECTION DATE: 17.7 WEEKS
HBA1C MFR BLD HPLC: 5.5 %
HGB A MFR BLD: 97.6 %
HGB A2 MFR BLD: 2.4 %
HGB FRACT BLD-IMP: NORMAL
INSULIN DEP DIABETES: NO
MATERNAL AGE AT EDD AFP: 35.8 YR
MULTIPLE GESTATION: NO
N GONORRHOEA RRNA SPEC QL NAA+PROBE: NOT DETECTED
OSBR RISK 1 IN: NORMAL
RACE: NORMAL
SOURCE AMPLIFICATION: NORMAL
TSH SERPL-ACNC: 2.77 UIU/ML
WEIGHT AFP: 118 LBS

## 2023-03-07 PROBLEM — D56.3 ALPHA THALASSEMIA MINOR TRAIT: Status: ACTIVE | Noted: 2023-02-21

## 2023-03-10 ENCOUNTER — LABORATORY RESULT (OUTPATIENT)
Age: 38
End: 2023-03-10

## 2023-03-10 ENCOUNTER — NON-APPOINTMENT (OUTPATIENT)
Age: 38
End: 2023-03-10

## 2023-03-10 ENCOUNTER — ASOB RESULT (OUTPATIENT)
Age: 38
End: 2023-03-10

## 2023-03-10 ENCOUNTER — APPOINTMENT (OUTPATIENT)
Dept: ANTEPARTUM | Facility: CLINIC | Age: 38
End: 2023-03-10

## 2023-03-10 ENCOUNTER — APPOINTMENT (OUTPATIENT)
Dept: ANTEPARTUM | Facility: CLINIC | Age: 38
End: 2023-03-10
Payer: COMMERCIAL

## 2023-03-10 PROCEDURE — 59000 AMNIOCENTESIS DIAGNOSTIC: CPT

## 2023-03-10 PROCEDURE — 76811 OB US DETAILED SNGL FETUS: CPT

## 2023-03-10 PROCEDURE — 76946 ECHO GUIDE FOR AMNIOCENTESIS: CPT

## 2023-03-12 ENCOUNTER — NON-APPOINTMENT (OUTPATIENT)
Age: 38
End: 2023-03-12

## 2023-03-17 ENCOUNTER — TRANSCRIPTION ENCOUNTER (OUTPATIENT)
Age: 38
End: 2023-03-17

## 2023-03-27 ENCOUNTER — NON-APPOINTMENT (OUTPATIENT)
Age: 38
End: 2023-03-27

## 2023-03-28 ENCOUNTER — NON-APPOINTMENT (OUTPATIENT)
Age: 38
End: 2023-03-28

## 2023-03-30 ENCOUNTER — NON-APPOINTMENT (OUTPATIENT)
Age: 38
End: 2023-03-30

## 2023-03-30 ENCOUNTER — APPOINTMENT (OUTPATIENT)
Dept: OBGYN | Facility: CLINIC | Age: 38
End: 2023-03-30
Payer: COMMERCIAL

## 2023-03-30 VITALS
SYSTOLIC BLOOD PRESSURE: 112 MMHG | WEIGHT: 125 LBS | BODY MASS INDEX: 22.15 KG/M2 | HEIGHT: 63 IN | DIASTOLIC BLOOD PRESSURE: 68 MMHG

## 2023-03-30 PROCEDURE — 0502F SUBSEQUENT PRENATAL CARE: CPT

## 2023-03-31 ENCOUNTER — NON-APPOINTMENT (OUTPATIENT)
Age: 38
End: 2023-03-31

## 2023-04-23 NOTE — DISCUSSION/SUMMARY
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

## 2023-04-23 NOTE — PROCEDURE
[Transabdominal OB Sonogram] : Transabdominal OB Sonogram [Intrauterine Pregnancy] : intrauterine pregnancy [Transvaginal OB Sonogram WNL] : Transvaginal OB Sonogram WNL [Transabdominal OB Sonogram WNL] : Transabdominal OB Sonogram WNL [FreeTextEntry1] : TAUS: single live intrauterine gestation with FHR 148bpm\par TVUS: CL 4cm

## 2023-04-23 NOTE — HISTORY OF PRESENT ILLNESS
[FreeTextEntry1] : NPN transfer\par \par 38yo  at 17w5d by LMP 10/28/2022 who presents for new prenatal transfer today. She has no complaints, feels well. \par \par Antepartum:\par Prenatal care with Dr. Aquino\par NT done wnl\par Patient and partner are both positive for alpha thalassemia and non-syndromic hearing loss.\par \par Menarche age 14 years\par Menses q 28 - 32 days, last 6 - 7 days. \par Last thin prep 2022.\par \par ObHx: G1 current\par GynHx: denies cysts, fibroids, STIs abnormal paps\par PMH: denies\par PSH: denies\par NKDA\par Meds: PNV\par Social: denies T/E/D\par Family Hx: Maternal grandmother with breast cancer diagnosed in her 70s (since )

## 2023-04-24 ENCOUNTER — APPOINTMENT (OUTPATIENT)
Dept: OBGYN | Facility: CLINIC | Age: 38
End: 2023-04-24
Payer: COMMERCIAL

## 2023-04-24 ENCOUNTER — ASOB RESULT (OUTPATIENT)
Age: 38
End: 2023-04-24

## 2023-04-24 DIAGNOSIS — O99.343 OTHER MENTAL DISORDERS COMPLICATING PREGNANCY, THIRD TRIMESTER: ICD-10-CM

## 2023-04-24 DIAGNOSIS — O99.019 ANEMIA COMPLICATING PREGNANCY, UNSPECIFIED TRIMESTER: ICD-10-CM

## 2023-04-24 DIAGNOSIS — O09.512 SUPERVISION OF ELDERLY PRIMIGRAVIDA, SECOND TRIMESTER: ICD-10-CM

## 2023-04-24 DIAGNOSIS — F41.9 OTHER MENTAL DISORDERS COMPLICATING PREGNANCY, THIRD TRIMESTER: ICD-10-CM

## 2023-04-24 PROCEDURE — 0502F SUBSEQUENT PRENATAL CARE: CPT

## 2023-04-24 PROCEDURE — 36415 COLL VENOUS BLD VENIPUNCTURE: CPT

## 2023-04-24 PROCEDURE — 76816 OB US FOLLOW-UP PER FETUS: CPT

## 2023-04-25 ENCOUNTER — TRANSCRIPTION ENCOUNTER (OUTPATIENT)
Age: 38
End: 2023-04-25

## 2023-04-25 DIAGNOSIS — R73.09 OTHER ABNORMAL GLUCOSE: ICD-10-CM

## 2023-04-25 LAB
25(OH)D3 SERPL-MCNC: 40 NG/ML
BASOPHILS # BLD AUTO: 0.02 K/UL
BASOPHILS NFR BLD AUTO: 0.3 %
BLD GP AB SCN SERPL QL: NORMAL
EOSINOPHIL # BLD AUTO: 0.06 K/UL
EOSINOPHIL NFR BLD AUTO: 0.8 %
GLUCOSE 1H P 50 G GLC PO SERPL-MCNC: 157 MG/DL
HCT VFR BLD CALC: 38.1 %
HCV AB SER QL: NONREACTIVE
HCV S/CO RATIO: 0.14 S/CO
HGB BLD-MCNC: 11.6 G/DL
HIV1+2 AB SPEC QL IA.RAPID: NONREACTIVE
IMM GRANULOCYTES NFR BLD AUTO: 0.1 %
LYMPHOCYTES # BLD AUTO: 1.01 K/UL
LYMPHOCYTES NFR BLD AUTO: 12.8 %
MAN DIFF?: NORMAL
MCHC RBC-ENTMCNC: 22.1 PG
MCHC RBC-ENTMCNC: 30.4 GM/DL
MCV RBC AUTO: 72.4 FL
MONOCYTES # BLD AUTO: 0.31 K/UL
MONOCYTES NFR BLD AUTO: 3.9 %
NEUTROPHILS # BLD AUTO: 6.48 K/UL
NEUTROPHILS NFR BLD AUTO: 82.1 %
PLATELET # BLD AUTO: 305 K/UL
RBC # BLD: 5.26 M/UL
RBC # FLD: 16.1 %
T PALLIDUM AB SER QL IA: NEGATIVE
WBC # FLD AUTO: 7.89 K/UL

## 2023-05-05 ENCOUNTER — NON-APPOINTMENT (OUTPATIENT)
Age: 38
End: 2023-05-05

## 2023-05-11 ENCOUNTER — TRANSCRIPTION ENCOUNTER (OUTPATIENT)
Age: 38
End: 2023-05-11

## 2023-05-18 ENCOUNTER — APPOINTMENT (OUTPATIENT)
Dept: OBGYN | Facility: CLINIC | Age: 38
End: 2023-05-18
Payer: COMMERCIAL

## 2023-05-18 ENCOUNTER — ASOB RESULT (OUTPATIENT)
Age: 38
End: 2023-05-18

## 2023-05-18 VITALS
DIASTOLIC BLOOD PRESSURE: 77 MMHG | WEIGHT: 131 LBS | HEIGHT: 63 IN | BODY MASS INDEX: 23.21 KG/M2 | SYSTOLIC BLOOD PRESSURE: 111 MMHG

## 2023-05-18 DIAGNOSIS — Z23 ENCOUNTER FOR IMMUNIZATION: ICD-10-CM

## 2023-05-18 PROCEDURE — 76816 OB US FOLLOW-UP PER FETUS: CPT

## 2023-05-18 PROCEDURE — 90715 TDAP VACCINE 7 YRS/> IM: CPT

## 2023-05-18 PROCEDURE — 0502F SUBSEQUENT PRENATAL CARE: CPT

## 2023-05-18 PROCEDURE — 90471 IMMUNIZATION ADMIN: CPT

## 2023-05-31 ENCOUNTER — NON-APPOINTMENT (OUTPATIENT)
Age: 38
End: 2023-05-31

## 2023-05-31 ENCOUNTER — ASOB RESULT (OUTPATIENT)
Age: 38
End: 2023-05-31

## 2023-05-31 ENCOUNTER — APPOINTMENT (OUTPATIENT)
Dept: OBGYN | Facility: CLINIC | Age: 38
End: 2023-05-31
Payer: COMMERCIAL

## 2023-05-31 VITALS — SYSTOLIC BLOOD PRESSURE: 112 MMHG | DIASTOLIC BLOOD PRESSURE: 71 MMHG | BODY MASS INDEX: 23.91 KG/M2 | WEIGHT: 135 LBS

## 2023-05-31 PROCEDURE — 0502F SUBSEQUENT PRENATAL CARE: CPT

## 2023-05-31 PROCEDURE — 76820 UMBILICAL ARTERY ECHO: CPT

## 2023-05-31 PROCEDURE — 76818 FETAL BIOPHYS PROFILE W/NST: CPT

## 2023-06-08 ENCOUNTER — ASOB RESULT (OUTPATIENT)
Age: 38
End: 2023-06-08

## 2023-06-08 ENCOUNTER — APPOINTMENT (OUTPATIENT)
Dept: OBGYN | Facility: CLINIC | Age: 38
End: 2023-06-08
Payer: COMMERCIAL

## 2023-06-08 ENCOUNTER — NON-APPOINTMENT (OUTPATIENT)
Age: 38
End: 2023-06-08

## 2023-06-08 VITALS
HEART RATE: 84 BPM | HEIGHT: 63 IN | WEIGHT: 134 LBS | BODY MASS INDEX: 23.74 KG/M2 | SYSTOLIC BLOOD PRESSURE: 123 MMHG | DIASTOLIC BLOOD PRESSURE: 81 MMHG

## 2023-06-08 PROCEDURE — 76820 UMBILICAL ARTERY ECHO: CPT | Mod: 59

## 2023-06-08 PROCEDURE — 0502F SUBSEQUENT PRENATAL CARE: CPT

## 2023-06-08 PROCEDURE — 76816 OB US FOLLOW-UP PER FETUS: CPT

## 2023-06-08 PROCEDURE — 76818 FETAL BIOPHYS PROFILE W/NST: CPT

## 2023-06-13 ENCOUNTER — APPOINTMENT (OUTPATIENT)
Dept: OBGYN | Facility: CLINIC | Age: 38
End: 2023-06-13

## 2023-06-20 ENCOUNTER — APPOINTMENT (OUTPATIENT)
Dept: OBGYN | Facility: CLINIC | Age: 38
End: 2023-06-20

## 2023-06-29 ENCOUNTER — ASOB RESULT (OUTPATIENT)
Age: 38
End: 2023-06-29

## 2023-06-29 ENCOUNTER — APPOINTMENT (OUTPATIENT)
Dept: OBGYN | Facility: CLINIC | Age: 38
End: 2023-06-29
Payer: COMMERCIAL

## 2023-06-29 PROCEDURE — 0502F SUBSEQUENT PRENATAL CARE: CPT

## 2023-06-29 PROCEDURE — 76816 OB US FOLLOW-UP PER FETUS: CPT

## 2023-06-29 PROCEDURE — 76819 FETAL BIOPHYS PROFIL W/O NST: CPT | Mod: 59

## 2023-07-06 ENCOUNTER — APPOINTMENT (OUTPATIENT)
Dept: OBGYN | Facility: CLINIC | Age: 38
End: 2023-07-06

## 2023-07-10 ENCOUNTER — NON-APPOINTMENT (OUTPATIENT)
Age: 38
End: 2023-07-10

## 2023-07-11 ENCOUNTER — APPOINTMENT (OUTPATIENT)
Dept: OBGYN | Facility: CLINIC | Age: 38
End: 2023-07-11
Payer: COMMERCIAL

## 2023-07-11 ENCOUNTER — APPOINTMENT (OUTPATIENT)
Dept: OBGYN | Facility: CLINIC | Age: 38
End: 2023-07-11

## 2023-07-11 DIAGNOSIS — Z36.85 ENCOUNTER FOR ANTENATAL SCREENING FOR STREPTOCOCCUS B: ICD-10-CM

## 2023-07-11 DIAGNOSIS — O09.523 SUPERVISION OF ELDERLY MULTIGRAVIDA, THIRD TRIMESTER: ICD-10-CM

## 2023-07-11 PROCEDURE — 0502F SUBSEQUENT PRENATAL CARE: CPT

## 2023-07-13 LAB
GP B STREP DNA SPEC QL NAA+PROBE: NOT DETECTED
SOURCE GBS: NORMAL

## 2023-07-14 ENCOUNTER — NON-APPOINTMENT (OUTPATIENT)
Age: 38
End: 2023-07-14

## 2023-07-18 ENCOUNTER — APPOINTMENT (OUTPATIENT)
Dept: OBGYN | Facility: CLINIC | Age: 38
End: 2023-07-18
Payer: COMMERCIAL

## 2023-07-18 ENCOUNTER — ASOB RESULT (OUTPATIENT)
Age: 38
End: 2023-07-18

## 2023-07-18 VITALS
SYSTOLIC BLOOD PRESSURE: 120 MMHG | HEART RATE: 80 BPM | BODY MASS INDEX: 25.34 KG/M2 | WEIGHT: 143 LBS | DIASTOLIC BLOOD PRESSURE: 80 MMHG | HEIGHT: 63 IN

## 2023-07-18 PROCEDURE — 76819 FETAL BIOPHYS PROFIL W/O NST: CPT

## 2023-07-18 PROCEDURE — 0502F SUBSEQUENT PRENATAL CARE: CPT

## 2023-07-25 ENCOUNTER — APPOINTMENT (OUTPATIENT)
Dept: OBGYN | Facility: CLINIC | Age: 38
End: 2023-07-25
Payer: COMMERCIAL

## 2023-07-25 ENCOUNTER — ASOB RESULT (OUTPATIENT)
Age: 38
End: 2023-07-25

## 2023-07-25 PROCEDURE — 76819 FETAL BIOPHYS PROFIL W/O NST: CPT | Mod: 59

## 2023-07-25 PROCEDURE — 59426 ANTEPARTUM CARE ONLY: CPT

## 2023-07-25 PROCEDURE — 76816 OB US FOLLOW-UP PER FETUS: CPT

## 2023-07-25 PROCEDURE — 0502F SUBSEQUENT PRENATAL CARE: CPT

## 2023-08-04 ENCOUNTER — NON-APPOINTMENT (OUTPATIENT)
Age: 38
End: 2023-08-04

## 2023-08-04 ENCOUNTER — APPOINTMENT (OUTPATIENT)
Dept: OBGYN | Facility: CLINIC | Age: 38
End: 2023-08-04
Payer: COMMERCIAL

## 2023-08-04 ENCOUNTER — ASOB RESULT (OUTPATIENT)
Age: 38
End: 2023-08-04

## 2023-08-04 VITALS
DIASTOLIC BLOOD PRESSURE: 79 MMHG | WEIGHT: 148 LBS | HEART RATE: 70 BPM | BODY MASS INDEX: 26.22 KG/M2 | SYSTOLIC BLOOD PRESSURE: 114 MMHG | HEIGHT: 63 IN

## 2023-08-04 DIAGNOSIS — Z34.00 ENCOUNTER FOR SUPERVISION OF NORMAL FIRST PREGNANCY, UNSPECIFIED TRIMESTER: ICD-10-CM

## 2023-08-04 PROCEDURE — 76818 FETAL BIOPHYS PROFILE W/NST: CPT

## 2023-08-04 PROCEDURE — 0502F SUBSEQUENT PRENATAL CARE: CPT

## 2023-08-06 ENCOUNTER — INPATIENT (INPATIENT)
Facility: HOSPITAL | Age: 38
LOS: 4 days | Discharge: HOME | End: 2023-08-11
Attending: OBSTETRICS & GYNECOLOGY | Admitting: OBSTETRICS & GYNECOLOGY
Payer: COMMERCIAL

## 2023-08-06 ENCOUNTER — TRANSCRIPTION ENCOUNTER (OUTPATIENT)
Age: 38
End: 2023-08-06

## 2023-08-06 VITALS — HEART RATE: 75 BPM | OXYGEN SATURATION: 98 % | TEMPERATURE: 98 F

## 2023-08-06 DIAGNOSIS — O09.513 SUPERVISION OF ELDERLY PRIMIGRAVIDA, THIRD TRIMESTER: ICD-10-CM

## 2023-08-06 RX ORDER — CHLORHEXIDINE GLUCONATE 213 G/1000ML
1 SOLUTION TOPICAL DAILY
Refills: 0 | Status: DISCONTINUED | OUTPATIENT
Start: 2023-08-06 | End: 2023-08-07

## 2023-08-06 RX ORDER — CITRIC ACID/SODIUM CITRATE 300-500 MG
15 SOLUTION, ORAL ORAL EVERY 6 HOURS
Refills: 0 | Status: DISCONTINUED | OUTPATIENT
Start: 2023-08-06 | End: 2023-08-07

## 2023-08-06 RX ORDER — OXYTOCIN 10 UNIT/ML
333.33 VIAL (ML) INJECTION
Qty: 20 | Refills: 0 | Status: DISCONTINUED | OUTPATIENT
Start: 2023-08-06 | End: 2023-08-07

## 2023-08-06 RX ORDER — SODIUM CHLORIDE 9 MG/ML
1000 INJECTION, SOLUTION INTRAVENOUS
Refills: 0 | Status: DISCONTINUED | OUTPATIENT
Start: 2023-08-06 | End: 2023-08-07

## 2023-08-06 NOTE — OB PROVIDER H&P - NSHPPHYSICALEXAM_GEN_ALL_CORE
Objective  – VS  T(C): 36.5 (08-06-23 @ 21:52)  HR: 76 (08-06-23 @ 22:12)  BP: 121/87 (08-06-23 @ 21:53)  RR: --  SpO2: 97% (08-06-23 @ 22:12)    Physical Exam  CV: RRR  Pulm: breathing comfortably on RA  Abd: gravid, nontender  Extr: moving all extremities with ease  – VE: 2/60/-3  – FHT: baseline 1, mod variability, +accels, -decels  – [place]: 4qmin  – EFW: 3340g by sono 2 weeks ago  – Sono: vertex

## 2023-08-06 NOTE — OB RN PATIENT PROFILE - PATIENT ON (OXYGEN DELIVERY METHOD)
Physical Therapy   Date: 1/16/2020  Patient canceled late (per policy guidelines) today's (1/16/2020) scheduled appointment.  This is the patient's first no show/late cancel.      Can't make it.   room air

## 2023-08-06 NOTE — PRE-ANESTHESIA EVALUATION ADULT - NSANTHPMHFT_GEN_ALL_CORE
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

## 2023-08-06 NOTE — OB PROVIDER H&P - ASSESSMENT
Assessment  37y G1 @ 40w2 presents for eIOL.     Plan  1. Admit to L+D. Routine Labs. IVF.  2. Expectant management/IOL w/ vaginal cytotect and cervical balloon.  3. Fetus: cat 1 tracing. VTX. EFW 3340g by sono. Continuous EFM. Sono. No concerns.  4. Prenatal issues: AMA, baby ASA during pregnancy  5. GBS (-)  6. Pain: IV pain meds/epidural PRN    Plan to be discussed wi/ attending physician, Dr. El Razo, PGY-1

## 2023-08-06 NOTE — OB PROVIDER H&P - HISTORY OF PRESENT ILLNESS
R1 Admission H&P    Subjective  HPI: 37y G1 @ 40w2 presents for eIOL.   +FM. -LOF. -CTXs. min Vag Bleeding. Pt denies any other concerns.    – PNC: Denies prenatal issues. GBS neg. EFW 3340g by arjun two weeks ago.  – OBHx: G1  – GynHx: denies fibroids, cysts, endometriosis, abnormal pap smears, STIs  – PMH: denies  – PSH: denies  – Psych: denies   – Social: denies   – Meds: PNV   – Allergies: NKDA  – Will accept blood transfusions? Yes

## 2023-08-07 LAB
APTT BLD: 33.5 SEC — SIGNIFICANT CHANGE UP (ref 24.5–35.6)
BASOPHILS # BLD AUTO: 0.02 K/UL — SIGNIFICANT CHANGE UP (ref 0–0.2)
BASOPHILS # BLD AUTO: 0.07 K/UL — SIGNIFICANT CHANGE UP (ref 0–0.2)
BASOPHILS NFR BLD AUTO: 0.3 % — SIGNIFICANT CHANGE UP (ref 0–2)
BASOPHILS NFR BLD AUTO: 0.4 % — SIGNIFICANT CHANGE UP (ref 0–2)
COVID-19 SPIKE DOMAIN AB INTERP: POSITIVE
COVID-19 SPIKE DOMAIN ANTIBODY RESULT: >250 U/ML — HIGH
EOSINOPHIL # BLD AUTO: 0.03 K/UL — SIGNIFICANT CHANGE UP (ref 0–0.5)
EOSINOPHIL # BLD AUTO: 0.04 K/UL — SIGNIFICANT CHANGE UP (ref 0–0.5)
EOSINOPHIL NFR BLD AUTO: 0.1 % — SIGNIFICANT CHANGE UP (ref 0–6)
EOSINOPHIL NFR BLD AUTO: 0.8 % — SIGNIFICANT CHANGE UP (ref 0–6)
FIBRINOGEN PPP-MCNC: 186 MG/DL — LOW (ref 200–445)
HCT VFR BLD CALC: 32.1 % — LOW (ref 34.5–45)
HCT VFR BLD CALC: 33.7 % — LOW (ref 34.5–45)
HGB BLD-MCNC: 10.5 G/DL — LOW (ref 11.5–15.5)
HGB BLD-MCNC: 9.9 G/DL — LOW (ref 11.5–15.5)
IMM GRANULOCYTES NFR BLD AUTO: 0.2 % — SIGNIFICANT CHANGE UP (ref 0–0.9)
IMM GRANULOCYTES NFR BLD AUTO: 0.7 % — SIGNIFICANT CHANGE UP (ref 0–0.9)
INR BLD: 1.05 RATIO — SIGNIFICANT CHANGE UP (ref 0.85–1.18)
LYMPHOCYTES # BLD AUTO: 0.63 K/UL — LOW (ref 1–3.3)
LYMPHOCYTES # BLD AUTO: 1.33 K/UL — SIGNIFICANT CHANGE UP (ref 1–3.3)
LYMPHOCYTES # BLD AUTO: 2.7 % — LOW (ref 13–44)
LYMPHOCYTES # BLD AUTO: 25.8 % — SIGNIFICANT CHANGE UP (ref 13–44)
MCHC RBC-ENTMCNC: 21.8 PG — LOW (ref 27–34)
MCHC RBC-ENTMCNC: 22 PG — LOW (ref 27–34)
MCHC RBC-ENTMCNC: 30.8 GM/DL — LOW (ref 32–36)
MCHC RBC-ENTMCNC: 31.2 GM/DL — LOW (ref 32–36)
MCV RBC AUTO: 70.1 FL — LOW (ref 80–100)
MCV RBC AUTO: 71.2 FL — LOW (ref 80–100)
MONOCYTES # BLD AUTO: 0.35 K/UL — SIGNIFICANT CHANGE UP (ref 0–0.9)
MONOCYTES # BLD AUTO: 1.13 K/UL — HIGH (ref 0–0.9)
MONOCYTES NFR BLD AUTO: 4.8 % — SIGNIFICANT CHANGE UP (ref 2–14)
MONOCYTES NFR BLD AUTO: 6.8 % — SIGNIFICANT CHANGE UP (ref 2–14)
NEUTROPHILS # BLD AUTO: 21.34 K/UL — HIGH (ref 1.8–7.4)
NEUTROPHILS # BLD AUTO: 3.4 K/UL — SIGNIFICANT CHANGE UP (ref 1.8–7.4)
NEUTROPHILS NFR BLD AUTO: 66 % — SIGNIFICANT CHANGE UP (ref 43–77)
NEUTROPHILS NFR BLD AUTO: 91.4 % — HIGH (ref 43–77)
NRBC # BLD: 0 /100 WBCS — SIGNIFICANT CHANGE UP (ref 0–0)
NRBC # BLD: 0 /100 WBCS — SIGNIFICANT CHANGE UP (ref 0–0)
PLATELET # BLD AUTO: 157 K/UL — SIGNIFICANT CHANGE UP (ref 150–400)
PLATELET # BLD AUTO: 223 K/UL — SIGNIFICANT CHANGE UP (ref 150–400)
PROTHROM AB SERPL-ACNC: 11.5 SEC — SIGNIFICANT CHANGE UP (ref 9.5–13)
RBC # BLD: 4.51 M/UL — SIGNIFICANT CHANGE UP (ref 3.8–5.2)
RBC # BLD: 4.81 M/UL — SIGNIFICANT CHANGE UP (ref 3.8–5.2)
RBC # FLD: 16.3 % — HIGH (ref 10.3–14.5)
RBC # FLD: 16.7 % — HIGH (ref 10.3–14.5)
SARS-COV-2 IGG+IGM SERPL QL IA: >250 U/ML — HIGH
SARS-COV-2 IGG+IGM SERPL QL IA: POSITIVE
WBC # BLD: 23.36 K/UL — HIGH (ref 3.8–10.5)
WBC # BLD: 5.15 K/UL — SIGNIFICANT CHANGE UP (ref 3.8–10.5)
WBC # FLD AUTO: 23.36 K/UL — HIGH (ref 3.8–10.5)
WBC # FLD AUTO: 5.15 K/UL — SIGNIFICANT CHANGE UP (ref 3.8–10.5)

## 2023-08-07 PROCEDURE — 59515 CESAREAN DELIVERY: CPT

## 2023-08-07 DEVICE — INTERCEED 3 X 4": Type: IMPLANTABLE DEVICE | Status: FUNCTIONAL

## 2023-08-07 RX ORDER — LANOLIN
1 OINTMENT (GRAM) TOPICAL EVERY 6 HOURS
Refills: 0 | Status: DISCONTINUED | OUTPATIENT
Start: 2023-08-07 | End: 2023-08-11

## 2023-08-07 RX ORDER — MORPHINE SULFATE 50 MG/1
2 CAPSULE, EXTENDED RELEASE ORAL ONCE
Refills: 0 | Status: DISCONTINUED | OUTPATIENT
Start: 2023-08-07 | End: 2023-08-09

## 2023-08-07 RX ORDER — SODIUM CHLORIDE 9 MG/ML
1000 INJECTION, SOLUTION INTRAVENOUS ONCE
Refills: 0 | Status: COMPLETED | OUTPATIENT
Start: 2023-08-07 | End: 2023-08-07

## 2023-08-07 RX ORDER — OXYTOCIN 10 UNIT/ML
333.33 VIAL (ML) INJECTION
Qty: 20 | Refills: 0 | Status: DISCONTINUED | OUTPATIENT
Start: 2023-08-07 | End: 2023-08-11

## 2023-08-07 RX ORDER — OXYCODONE HYDROCHLORIDE 5 MG/1
5 TABLET ORAL
Refills: 0 | Status: COMPLETED | OUTPATIENT
Start: 2023-08-07 | End: 2023-08-14

## 2023-08-07 RX ORDER — KETOROLAC TROMETHAMINE 30 MG/ML
30 SYRINGE (ML) INJECTION EVERY 6 HOURS
Refills: 0 | Status: DISCONTINUED | OUTPATIENT
Start: 2023-08-07 | End: 2023-08-08

## 2023-08-07 RX ORDER — HEPARIN SODIUM 5000 [USP'U]/ML
5000 INJECTION INTRAVENOUS; SUBCUTANEOUS
Refills: 0 | Status: DISCONTINUED | OUTPATIENT
Start: 2023-08-07 | End: 2023-08-11

## 2023-08-07 RX ORDER — SODIUM CHLORIDE 9 MG/ML
1000 INJECTION, SOLUTION INTRAVENOUS
Refills: 0 | Status: DISCONTINUED | OUTPATIENT
Start: 2023-08-07 | End: 2023-08-08

## 2023-08-07 RX ORDER — MAGNESIUM HYDROXIDE 400 MG/1
30 TABLET, CHEWABLE ORAL
Refills: 0 | Status: DISCONTINUED | OUTPATIENT
Start: 2023-08-07 | End: 2023-08-11

## 2023-08-07 RX ORDER — SIMETHICONE 80 MG/1
80 TABLET, CHEWABLE ORAL EVERY 4 HOURS
Refills: 0 | Status: DISCONTINUED | OUTPATIENT
Start: 2023-08-07 | End: 2023-08-11

## 2023-08-07 RX ORDER — OXYTOCIN 10 UNIT/ML
2 VIAL (ML) INJECTION
Qty: 30 | Refills: 0 | Status: DISCONTINUED | OUTPATIENT
Start: 2023-08-07 | End: 2023-08-07

## 2023-08-07 RX ORDER — IBUPROFEN 200 MG
600 TABLET ORAL EVERY 6 HOURS
Refills: 0 | Status: DISCONTINUED | OUTPATIENT
Start: 2023-08-07 | End: 2023-08-08

## 2023-08-07 RX ORDER — ACETAMINOPHEN 500 MG
975 TABLET ORAL
Refills: 0 | Status: DISCONTINUED | OUTPATIENT
Start: 2023-08-07 | End: 2023-08-11

## 2023-08-07 RX ORDER — OXYCODONE HYDROCHLORIDE 5 MG/1
5 TABLET ORAL ONCE
Refills: 0 | Status: DISCONTINUED | OUTPATIENT
Start: 2023-08-07 | End: 2023-08-11

## 2023-08-07 RX ORDER — DIPHENHYDRAMINE HCL 50 MG
25 CAPSULE ORAL EVERY 6 HOURS
Refills: 0 | Status: DISCONTINUED | OUTPATIENT
Start: 2023-08-07 | End: 2023-08-11

## 2023-08-07 RX ORDER — TETANUS TOXOID, REDUCED DIPHTHERIA TOXOID AND ACELLULAR PERTUSSIS VACCINE, ADSORBED 5; 2.5; 8; 8; 2.5 [IU]/.5ML; [IU]/.5ML; UG/.5ML; UG/.5ML; UG/.5ML
0.5 SUSPENSION INTRAMUSCULAR ONCE
Refills: 0 | Status: DISCONTINUED | OUTPATIENT
Start: 2023-08-07 | End: 2023-08-11

## 2023-08-07 RX ADMIN — SODIUM CHLORIDE 1000 MILLILITER(S): 9 INJECTION, SOLUTION INTRAVENOUS at 23:35

## 2023-08-07 NOTE — OB PROVIDER LABOR PROGRESS NOTE - NSVAGINALEXAM_OBGYN_ALL_OB_DT
07-Aug-2023
07-Aug-2023 08:39
07-Aug-2023 17:24
07-Aug-2023
07-Aug-2023 16:30
07-Aug-2023 13:47
07-Aug-2023 00:12

## 2023-08-07 NOTE — OB RN DELIVERY SUMMARY - NS_SEPSISRSKCALC_OBGYN_ALL_OB_FT
EOS calculated successfully. EOS Risk Factor: 0.5/1000 live births ([facility] national incidence); GA=40w3d; Temp=99.5; ROM=3.233; GBS='Negative'; Antibiotics='No antibiotics or any antibiotics < 2 hrs prior to birth'

## 2023-08-07 NOTE — OB RN DELIVERY SUMMARY - NS_CORDBLDBNKA_OBGYN_ALL_OB
Cm spoke with Breann Champagne at [place] ([phone]) regarding possible placements for medical based rehabs in [address] [facility]  Breann Champagne provided some resources  [facility] [phone]- 2-4 week waiting list     [facility] [phone] and informed they only provide detox  [facility] [phone]: pt signed Antonio Hinson willing to accept referral faxed [phone]    [facility] [phone]: pt signed  [facility] willing to accept referral faxed [phone]  Cm [facility] regarding [facility] referral and was informed someone would return call  No

## 2023-08-07 NOTE — OB PROVIDER LABOR PROGRESS NOTE - ASSESSMENT
A/P: 37yoF  @40.3 weeks gestation admitted for eIOL.   - Continue Vaginal Cytotec & Cervical Balloon   - EFM/[place]: continuous monitoring   - GBS negative   - Resuscitative measures prn    - Epidural prn   - Plan per Dr. El Malcolm PAStevanC

## 2023-08-07 NOTE — OB PROVIDER DELIVERY SUMMARY - NSSELHIDDEN_OBGYN_ALL_OB_FT
[NS_DeliveryAttending1_OBGYN_ALL_OB_FT:[id number]],[NS_DeliveryAssist1_OBGYN_ALL_OB_FT:[id number]],[NS_DeliveryRN_OBGYN_ALL_OB_FT:[id number]]

## 2023-08-07 NOTE — OB PROVIDER LABOR PROGRESS NOTE - ASSESSMENT
38 y/o  @ 40.3 weeks admitted elective IOL  -AROM performed without incident, clear fluid  -pitocin currently infusing at 16 milliunits/min    seen and evaluated with Dr. Tod Irene NP 36 y/o  @ 40.3 weeks admitted elective IOL  -AROM performed without incident, clear fluid  -pitocin currently infusing at 16 milliunits/min    seen and evaluated with Dr. Tod Irene NP    OB Attg:  Pt seen and AROM performed by me, I agree with above assessment and plan.  KAROLINA Baron MD

## 2023-08-07 NOTE — OB RN INTRAOPERATIVE NOTE - NSSELHIDDEN_OBGYN_ALL_OB_FT
[NS_DeliveryAttending1_OBGYN_ALL_OB_FT:[id number]],[NS_DeliveryAssist1_OBGYN_ALL_OB_FT:[id number]],[NS_DeliveryRN_OBGYN_ALL_OB_FT:[id number]]

## 2023-08-07 NOTE — OB PROVIDER LABOR PROGRESS NOTE - ASSESSMENT
38 y/o @ 40.3 weeks admited for an elective IOL  -patient currently on pitocin @ 10 milliunits/min-> will increase by 2 milliunits/min until functional pattern achieved  -peanut ball applied in right lateral position for fetal head descent    d/w Dr. Tod Irene NP

## 2023-08-07 NOTE — OB PROVIDER LABOR PROGRESS NOTE - ASSESSMENT
no change in descent and cervix becoming more edematous    r/b/a of continued induction vs csection  pt opts for   all questions answered  periop counsling performed  anesthesi/nursing notified

## 2023-08-07 NOTE — OB PROVIDER LABOR PROGRESS NOTE - NS_SUBJECTIVE/OBJECTIVE_OBGYN_ALL_OB_FT
NP Chart Note:    The patient was examined for further labor management and noted to remain 4/60/-3. The urine was also noted to be bloody , adequate output.
NP Chart Note:    The patient was examined for further labor management.     Cervical balloon was noted to be dislodged, removed.  The patient was examined and found to be 4/70/-3 intact. The fetal head felt slightly asynclitic.  Bedside sonogram performed with cephalic presentation confirmed.
pt feeling ctx pain
pt still feeling ctx pain
Pt seen for placement of cervical balloon  CB placed 60 cc NS in uterine, 60 in vaginal.  Vaginal Cytotec placed.   Pt tolerated well without pain intervention.   VE: 2/70/-3    Vital Signs Last 24 Hrs  T(C): 36.5 (06 Aug 2023 22:44), Max: 36.5 (06 Aug 2023 21:52)  T(F): 97.7 (06 Aug 2023 22:44), Max: 97.7 (06 Aug 2023 21:52)  HR: 76 (07 Aug 2023 00:22) (59 - 84)  BP: 121/87 (06 Aug 2023 22:44) (121/87 - 121/87)  RR: 18 (06 Aug 2023 22:44) (18 - 18)  SpO2: 97% (07 Aug 2023 00:22) (94% - 99%)
NP Chart Note:    The patient was examined for further labor management. Found to be 4/70/-3 intact.  Will increase pitocin with possible AROM at 4p.
NP Chart Note;    The patient was examined for AROM.  Fetal head remains high but well applied to cervix.  The patient was noted to make cervical change from 4->5/60/-3.  AROM performed @ 520p clear fluid.  The patient is currently dandre Q2mins.

## 2023-08-07 NOTE — OB PROVIDER DELIVERY SUMMARY - NSPROVIDERDELIVERYNOTE_OBGYN_ALL_OB_FT
pLTCS for category II tracing  Viable male infant, vertex presentation, Apgars 8/9, Wt 7#4  Hysterotomy closed in two layers using Caprosyn. Right lower extension of hysterotomy. Interceed placed over hysterotomy. Uterine atony noted and improved s/p IM Methergine and TXA.  Grossly normal fallopian tubes, uterus, and ovaries.  Peritoneum closed in running fashion. Muscle closed in running fashion. Fascia closed in running fashion. Subcutaneous tissue closed in running fashion. Skin closed with Luis. Rectal cytotec 1000 mcg placed.    EBL: 1755  IVF: 2.4 L  UOP: 140    STAT CBC/Coags in PACU

## 2023-08-07 NOTE — OB RN DELIVERY SUMMARY - NSSELHIDDEN_OBGYN_ALL_OB_FT
[NS_DeliveryAttending1_OBGYN_ALL_OB_FT:[id number]==],[NS_DeliveryAssist1_OBGYN_ALL_OB_FT:[id number]],[NS_DeliveryRN_OBGYN_ALL_OB_FT:[id number]=]

## 2023-08-07 NOTE — OB PROVIDER LABOR PROGRESS NOTE - ASSESSMENT
backnote:  IUPC placed  pit titrating up to 200 MV units    pt started to have lates so pitocin decreased.  exam unchanged  cont to monitor closely.  pt making no sig change in descent/dilation since am  r/b/a continuing induction vs   pt verbalized full understanding

## 2023-08-08 LAB
ALBUMIN SERPL ELPH-MCNC: 2.4 G/DL — LOW (ref 3.3–5)
ALP SERPL-CCNC: 94 U/L — SIGNIFICANT CHANGE UP (ref 40–120)
ALT FLD-CCNC: 17 U/L — SIGNIFICANT CHANGE UP (ref 10–45)
ANION GAP SERPL CALC-SCNC: 12 MMOL/L — SIGNIFICANT CHANGE UP (ref 5–17)
ANION GAP SERPL CALC-SCNC: 13 MMOL/L — SIGNIFICANT CHANGE UP (ref 5–17)
ANISOCYTOSIS BLD QL: SLIGHT — SIGNIFICANT CHANGE UP
ANISOCYTOSIS BLD QL: SLIGHT — SIGNIFICANT CHANGE UP
APTT BLD: 23.6 SEC — LOW (ref 24.5–35.6)
APTT BLD: 34.3 SEC — SIGNIFICANT CHANGE UP (ref 24.5–35.6)
AST SERPL-CCNC: 34 U/L — SIGNIFICANT CHANGE UP (ref 10–40)
BASOPHILS # BLD AUTO: 0 K/UL — SIGNIFICANT CHANGE UP (ref 0–0.2)
BASOPHILS # BLD AUTO: 0 K/UL — SIGNIFICANT CHANGE UP (ref 0–0.2)
BASOPHILS NFR BLD AUTO: 0 % — SIGNIFICANT CHANGE UP (ref 0–2)
BASOPHILS NFR BLD AUTO: 0 % — SIGNIFICANT CHANGE UP (ref 0–2)
BILIRUB SERPL-MCNC: 0.5 MG/DL — SIGNIFICANT CHANGE UP (ref 0.2–1.2)
BUN SERPL-MCNC: 12 MG/DL — SIGNIFICANT CHANGE UP (ref 7–23)
BUN SERPL-MCNC: 16 MG/DL — SIGNIFICANT CHANGE UP (ref 7–23)
BURR CELLS BLD QL SMEAR: PRESENT — SIGNIFICANT CHANGE UP
CALCIUM SERPL-MCNC: 7.8 MG/DL — LOW (ref 8.4–10.5)
CALCIUM SERPL-MCNC: 8 MG/DL — LOW (ref 8.4–10.5)
CHLORIDE SERPL-SCNC: 105 MMOL/L — SIGNIFICANT CHANGE UP (ref 96–108)
CHLORIDE SERPL-SCNC: 107 MMOL/L — SIGNIFICANT CHANGE UP (ref 96–108)
CO2 SERPL-SCNC: 19 MMOL/L — LOW (ref 22–31)
CO2 SERPL-SCNC: 20 MMOL/L — LOW (ref 22–31)
CREAT SERPL-MCNC: 0.85 MG/DL — SIGNIFICANT CHANGE UP (ref 0.5–1.3)
CREAT SERPL-MCNC: 1.01 MG/DL — SIGNIFICANT CHANGE UP (ref 0.5–1.3)
EGFR: 74 ML/MIN/1.73M2 — SIGNIFICANT CHANGE UP
EGFR: 92 ML/MIN/1.73M2 — SIGNIFICANT CHANGE UP
ELLIPTOCYTES BLD QL SMEAR: SLIGHT — SIGNIFICANT CHANGE UP
EOSINOPHIL # BLD AUTO: 0 K/UL — SIGNIFICANT CHANGE UP (ref 0–0.5)
EOSINOPHIL # BLD AUTO: 0 K/UL — SIGNIFICANT CHANGE UP (ref 0–0.5)
EOSINOPHIL NFR BLD AUTO: 0 % — SIGNIFICANT CHANGE UP (ref 0–6)
EOSINOPHIL NFR BLD AUTO: 0 % — SIGNIFICANT CHANGE UP (ref 0–6)
FIBRINOGEN PPP-MCNC: 178 MG/DL — LOW (ref 200–445)
FIBRINOGEN PPP-MCNC: 305 MG/DL — SIGNIFICANT CHANGE UP (ref 200–445)
FSP PPP-MCNC: >=20 UG/ML
GIANT PLATELETS BLD QL SMEAR: PRESENT — SIGNIFICANT CHANGE UP
GLUCOSE SERPL-MCNC: 114 MG/DL — HIGH (ref 70–99)
GLUCOSE SERPL-MCNC: 88 MG/DL — SIGNIFICANT CHANGE UP (ref 70–99)
HCT VFR BLD CALC: 24.7 % — LOW (ref 34.5–45)
HCT VFR BLD CALC: 28.4 % — LOW (ref 34.5–45)
HCT VFR BLD CALC: 30.5 % — LOW (ref 34.5–45)
HGB BLD-MCNC: 10.2 G/DL — LOW (ref 11.5–15.5)
HGB BLD-MCNC: 7.7 G/DL — LOW (ref 11.5–15.5)
HGB BLD-MCNC: 9.5 G/DL — LOW (ref 11.5–15.5)
INR BLD: 1.06 RATIO — SIGNIFICANT CHANGE UP (ref 0.85–1.18)
INR BLD: 1.09 RATIO — SIGNIFICANT CHANGE UP (ref 0.85–1.18)
LACTATE SERPL-SCNC: 1.3 MMOL/L — SIGNIFICANT CHANGE UP (ref 0.5–2)
LACTATE SERPL-SCNC: 1.6 MMOL/L — SIGNIFICANT CHANGE UP (ref 0.5–2)
LYMPHOCYTES # BLD AUTO: 0 % — LOW (ref 13–44)
LYMPHOCYTES # BLD AUTO: 0 K/UL — LOW (ref 1–3.3)
LYMPHOCYTES # BLD AUTO: 1 K/UL — SIGNIFICANT CHANGE UP (ref 1–3.3)
LYMPHOCYTES # BLD AUTO: 6.2 % — LOW (ref 13–44)
MANUAL SMEAR VERIFICATION: SIGNIFICANT CHANGE UP
MANUAL SMEAR VERIFICATION: SIGNIFICANT CHANGE UP
MCHC RBC-ENTMCNC: 21.9 PG — LOW (ref 27–34)
MCHC RBC-ENTMCNC: 25.1 PG — LOW (ref 27–34)
MCHC RBC-ENTMCNC: 25.2 PG — LOW (ref 27–34)
MCHC RBC-ENTMCNC: 31.2 GM/DL — LOW (ref 32–36)
MCHC RBC-ENTMCNC: 33.4 GM/DL — SIGNIFICANT CHANGE UP (ref 32–36)
MCHC RBC-ENTMCNC: 33.5 GM/DL — SIGNIFICANT CHANGE UP (ref 32–36)
MCV RBC AUTO: 70.2 FL — LOW (ref 80–100)
MCV RBC AUTO: 74.9 FL — LOW (ref 80–100)
MCV RBC AUTO: 75.3 FL — LOW (ref 80–100)
MICROCYTES BLD QL: SLIGHT — SIGNIFICANT CHANGE UP
MONOCYTES # BLD AUTO: 0.35 K/UL — SIGNIFICANT CHANGE UP (ref 0–0.9)
MONOCYTES # BLD AUTO: 0.44 K/UL — SIGNIFICANT CHANGE UP (ref 0–0.9)
MONOCYTES NFR BLD AUTO: 1.8 % — LOW (ref 2–14)
MONOCYTES NFR BLD AUTO: 2.7 % — SIGNIFICANT CHANGE UP (ref 2–14)
NEUTROPHILS # BLD AUTO: 14.76 K/UL — HIGH (ref 1.8–7.4)
NEUTROPHILS # BLD AUTO: 19.11 K/UL — HIGH (ref 1.8–7.4)
NEUTROPHILS NFR BLD AUTO: 80.5 % — HIGH (ref 43–77)
NEUTROPHILS NFR BLD AUTO: 96.5 % — HIGH (ref 43–77)
NEUTS BAND # BLD: 1.7 % — SIGNIFICANT CHANGE UP (ref 0–8)
NEUTS BAND # BLD: 10.6 % — HIGH (ref 0–8)
NRBC # BLD: 0 /100 WBCS — SIGNIFICANT CHANGE UP (ref 0–0)
OVALOCYTES BLD QL SMEAR: SLIGHT — SIGNIFICANT CHANGE UP
PLAT MORPH BLD: NORMAL — SIGNIFICANT CHANGE UP
PLAT MORPH BLD: NORMAL — SIGNIFICANT CHANGE UP
PLATELET # BLD AUTO: 101 K/UL — LOW (ref 150–400)
PLATELET # BLD AUTO: 84 K/UL — LOW (ref 150–400)
PLATELET # BLD AUTO: 86 K/UL — LOW (ref 150–400)
POIKILOCYTOSIS BLD QL AUTO: SIGNIFICANT CHANGE UP
POIKILOCYTOSIS BLD QL AUTO: SLIGHT — SIGNIFICANT CHANGE UP
POLYCHROMASIA BLD QL SMEAR: SLIGHT — SIGNIFICANT CHANGE UP
POTASSIUM SERPL-MCNC: 4.1 MMOL/L — SIGNIFICANT CHANGE UP (ref 3.5–5.3)
POTASSIUM SERPL-MCNC: 4.1 MMOL/L — SIGNIFICANT CHANGE UP (ref 3.5–5.3)
POTASSIUM SERPL-SCNC: 4.1 MMOL/L — SIGNIFICANT CHANGE UP (ref 3.5–5.3)
POTASSIUM SERPL-SCNC: 4.1 MMOL/L — SIGNIFICANT CHANGE UP (ref 3.5–5.3)
PROT SERPL-MCNC: 4.5 G/DL — LOW (ref 6–8.3)
PROTHROM AB SERPL-ACNC: 11.1 SEC — SIGNIFICANT CHANGE UP (ref 9.5–13)
PROTHROM AB SERPL-ACNC: 12 SEC — SIGNIFICANT CHANGE UP (ref 9.5–13)
RBC # BLD: 3.52 M/UL — LOW (ref 3.8–5.2)
RBC # BLD: 3.79 M/UL — LOW (ref 3.8–5.2)
RBC # BLD: 4.05 M/UL — SIGNIFICANT CHANGE UP (ref 3.8–5.2)
RBC # FLD: 16.6 % — HIGH (ref 10.3–14.5)
RBC # FLD: 20 % — HIGH (ref 10.3–14.5)
RBC # FLD: 20.4 % — HIGH (ref 10.3–14.5)
RBC BLD AUTO: ABNORMAL
RBC BLD AUTO: ABNORMAL
SCHISTOCYTES BLD QL AUTO: SLIGHT — SIGNIFICANT CHANGE UP
SCHISTOCYTES BLD QL AUTO: SLIGHT — SIGNIFICANT CHANGE UP
SMUDGE CELLS # BLD: PRESENT — SIGNIFICANT CHANGE UP
SODIUM SERPL-SCNC: 137 MMOL/L — SIGNIFICANT CHANGE UP (ref 135–145)
SODIUM SERPL-SCNC: 139 MMOL/L — SIGNIFICANT CHANGE UP (ref 135–145)
SPHEROCYTES BLD QL SMEAR: SLIGHT — SIGNIFICANT CHANGE UP
T PALLIDUM AB TITR SER: NEGATIVE — SIGNIFICANT CHANGE UP
WBC # BLD: 15.26 K/UL — HIGH (ref 3.8–10.5)
WBC # BLD: 16.2 K/UL — HIGH (ref 3.8–10.5)
WBC # BLD: 19.46 K/UL — HIGH (ref 3.8–10.5)
WBC # FLD AUTO: 15.26 K/UL — HIGH (ref 3.8–10.5)
WBC # FLD AUTO: 16.2 K/UL — HIGH (ref 3.8–10.5)
WBC # FLD AUTO: 19.46 K/UL — HIGH (ref 3.8–10.5)

## 2023-08-08 PROCEDURE — 71045 X-RAY EXAM CHEST 1 VIEW: CPT | Mod: 26

## 2023-08-08 PROCEDURE — 71045 X-RAY EXAM CHEST 1 VIEW: CPT | Mod: 26,77

## 2023-08-08 RX ORDER — SODIUM CHLORIDE 9 MG/ML
1000 INJECTION, SOLUTION INTRAVENOUS
Refills: 0 | Status: DISCONTINUED | OUTPATIENT
Start: 2023-08-08 | End: 2023-08-08

## 2023-08-08 RX ORDER — AMPICILLIN SODIUM AND SULBACTAM SODIUM 250; 125 MG/ML; MG/ML
3 INJECTION, POWDER, FOR SUSPENSION INTRAMUSCULAR; INTRAVENOUS EVERY 6 HOURS
Refills: 0 | Status: DISCONTINUED | OUTPATIENT
Start: 2023-08-08 | End: 2023-08-09

## 2023-08-08 RX ORDER — SODIUM CHLORIDE 9 MG/ML
1000 INJECTION, SOLUTION INTRAVENOUS
Refills: 0 | Status: DISCONTINUED | OUTPATIENT
Start: 2023-08-08 | End: 2023-08-11

## 2023-08-08 RX ORDER — ONDANSETRON 8 MG/1
4 TABLET, FILM COATED ORAL ONCE
Refills: 0 | Status: DISCONTINUED | OUTPATIENT
Start: 2023-08-08 | End: 2023-08-08

## 2023-08-08 RX ORDER — DIPHENHYDRAMINE HCL 50 MG
25 CAPSULE ORAL ONCE
Refills: 0 | Status: COMPLETED | OUTPATIENT
Start: 2023-08-08 | End: 2023-08-08

## 2023-08-08 RX ORDER — AMPICILLIN SODIUM AND SULBACTAM SODIUM 250; 125 MG/ML; MG/ML
3 INJECTION, POWDER, FOR SUSPENSION INTRAMUSCULAR; INTRAVENOUS ONCE
Refills: 0 | Status: COMPLETED | OUTPATIENT
Start: 2023-08-08 | End: 2023-08-08

## 2023-08-08 RX ORDER — AMPICILLIN SODIUM AND SULBACTAM SODIUM 250; 125 MG/ML; MG/ML
INJECTION, POWDER, FOR SUSPENSION INTRAMUSCULAR; INTRAVENOUS
Refills: 0 | Status: DISCONTINUED | OUTPATIENT
Start: 2023-08-08 | End: 2023-08-09

## 2023-08-08 RX ORDER — ONDANSETRON 8 MG/1
4 TABLET, FILM COATED ORAL ONCE
Refills: 0 | Status: COMPLETED | OUTPATIENT
Start: 2023-08-08 | End: 2023-08-08

## 2023-08-08 RX ORDER — OXYCODONE HYDROCHLORIDE 5 MG/1
5 TABLET ORAL
Refills: 0 | Status: DISCONTINUED | OUTPATIENT
Start: 2023-08-08 | End: 2023-08-11

## 2023-08-08 RX ADMIN — AMPICILLIN SODIUM AND SULBACTAM SODIUM 200 GRAM(S): 250; 125 INJECTION, POWDER, FOR SUSPENSION INTRAMUSCULAR; INTRAVENOUS at 02:41

## 2023-08-08 RX ADMIN — AMPICILLIN SODIUM AND SULBACTAM SODIUM 200 GRAM(S): 250; 125 INJECTION, POWDER, FOR SUSPENSION INTRAMUSCULAR; INTRAVENOUS at 21:43

## 2023-08-08 RX ADMIN — Medication 975 MILLIGRAM(S): at 21:42

## 2023-08-08 RX ADMIN — Medication 975 MILLIGRAM(S): at 00:28

## 2023-08-08 RX ADMIN — SODIUM CHLORIDE 125 MILLILITER(S): 9 INJECTION, SOLUTION INTRAVENOUS at 09:06

## 2023-08-08 RX ADMIN — Medication 975 MILLIGRAM(S): at 10:10

## 2023-08-08 RX ADMIN — Medication 0.2 MILLIGRAM(S): at 02:28

## 2023-08-08 RX ADMIN — OXYCODONE HYDROCHLORIDE 5 MILLIGRAM(S): 5 TABLET ORAL at 23:38

## 2023-08-08 RX ADMIN — SODIUM CHLORIDE 125 MILLILITER(S): 9 INJECTION, SOLUTION INTRAVENOUS at 13:43

## 2023-08-08 RX ADMIN — Medication 975 MILLIGRAM(S): at 22:12

## 2023-08-08 RX ADMIN — AMPICILLIN SODIUM AND SULBACTAM SODIUM 200 GRAM(S): 250; 125 INJECTION, POWDER, FOR SUSPENSION INTRAMUSCULAR; INTRAVENOUS at 08:48

## 2023-08-08 RX ADMIN — AMPICILLIN SODIUM AND SULBACTAM SODIUM 200 GRAM(S): 250; 125 INJECTION, POWDER, FOR SUSPENSION INTRAMUSCULAR; INTRAVENOUS at 14:58

## 2023-08-08 RX ADMIN — Medication 0.2 MILLIGRAM(S): at 10:27

## 2023-08-08 RX ADMIN — Medication 975 MILLIGRAM(S): at 14:34

## 2023-08-08 RX ADMIN — Medication 0.2 MILLIGRAM(S): at 18:30

## 2023-08-08 RX ADMIN — Medication 975 MILLIGRAM(S): at 08:26

## 2023-08-08 RX ADMIN — Medication 0.2 MILLIGRAM(S): at 06:09

## 2023-08-08 RX ADMIN — Medication 25 MILLIGRAM(S): at 03:09

## 2023-08-08 RX ADMIN — OXYCODONE HYDROCHLORIDE 5 MILLIGRAM(S): 5 TABLET ORAL at 23:08

## 2023-08-08 RX ADMIN — ONDANSETRON 4 MILLIGRAM(S): 8 TABLET, FILM COATED ORAL at 02:15

## 2023-08-08 RX ADMIN — Medication 0.2 MILLIGRAM(S): at 14:34

## 2023-08-08 NOTE — PROVIDER CONTACT NOTE (CHANGE IN STATUS NOTIFICATION) - BACKGROUND
s/p c/s  qbl 1755  s/p 1L fluid bolus
pt received 2.3 L intraop  140 urine output via caro  QBL 1755ml

## 2023-08-08 NOTE — PROVIDER CONTACT NOTE (CHANGE IN STATUS NOTIFICATION) - ASSESSMENT
1hr post op, 0ml urine output  pt tachy to 112  H&H 9.9/32.1 from 10.5/33.7  A&Ox4, VSS
pt presents with upper extremity edema, low urine output

## 2023-08-08 NOTE — PROVIDER CONTACT NOTE (CHANGE IN STATUS NOTIFICATION) - ACTION/TREATMENT ORDERED:
as per MD Sinks and anesthesia DAREK Cifuentes pt to receive 1L bolus at this time followed by repeat CBC and coags at 0230. will cont to monitor
pt to be ordered for chest xray as per md tavares, pt to be assessed by md at this time, will cont to monitor

## 2023-08-08 NOTE — OB NEONATOLOGY/PEDIATRICIAN DELIVERY SUMMARY - NSPEDSNEONOTESA_OBGYN_ALL_OB_FT
Peds called to OR for NRFHT. 40.3 wk AGA male born via C/S (FTP) on  at 2032 to a 34 y/o  mother. No significant maternal or prenatal history. Maternal labs include Blood Type B+, HIV Negative, RPR Non Reactive , Rubella Immune, Hep B Negative, GBS - from , AROM at 1710 with clear fluids (ROM hours: ~3.5). Baby emerged vigorous, crying, was warmed, dried suctioned and stimulated with APGARS of 8/9. Mom plans to initiate breastfeeding, consents Hep B vaccine and consents circ. Highest maternal temp: 37.5. EOS 0.23 Arrived at ~2 MOL- Peds called to OR for NRFHT. 40.3 wk AGA male born via C/S (FTP) on  at  to a 36 y/o  mother. No significant maternal or prenatal history. Maternal labs include Blood Type B+, HIV Negative, RPR Non Reactive , Rubella Immune, Hep B Negative, GBS - from , AROM at 1710 with clear fluids (ROM hours: ~3.5). Baby emerged vigorous, crying, was warmed, dried suctioned and stimulated with APGARS of 8/9. Mom plans to initiate breastfeeding, consents Hep B vaccine and consents circ. Highest maternal temp: 37.5. EOS 0.23

## 2023-08-08 NOTE — CHART NOTE - NSCHARTNOTEFT_GEN_A_CORE
Briefly, patient is POD#1 s/p pLTCS for cat II tracing c/b PPH (QBL 1755) secondary to atony. Patient is s/p IM Methergine, TXA, Cytotec per rectum. Currently asymptomatic - denies dizziness, lightheadedness, SOB, CP. RN reports 0 cc urine output since surgery.    Vitals at bedside:  HR low 100s  BP 110s/70s    On exam, fundus is firm below umbilicus. No vaginal bleeding noted with fundal pressure; no blood on pad.  Clear, red urine in caro noted on exam (10 cc)    Labs reviewed, Hct 33.7->32.1    A/P: POD#1 s/p pLTCS c/b PPH, currently HDS with with low UOP.  - Flush caro  - 1L bolus  - Per anesthesia, will repeat CBC/Coags at 2:30 am prior to epidural removal  - Patient cleared to eat/drink  - Methergine series    D/w Dr. Tod Potter PGY2
R4 Eval Note     Patient seen and evaluated at bedside. In brief, patient is 4h postop s/p pLTCS c/b PPH of 1755cc total EBL during the C/S. Immediate postop labs stable H/H, not yet equilibrated. Also notable for fibrinogen 186. At this time, patient with O2 sat 91-93%, denies any shortness of breath or chest pain. -120. T 38.2 rectally. UOP low since delivery. Output in caro frankly bloody (was bloody pre-op).     VS  T(C): 37.2 (08-08-23 @ 01:35)  HR: 106 (08-08-23 @ 01:50)  BP: 116/78 (08-08-23 @ 01:35)  RR: 16 (08-08-23 @ 00:25)  SpO2: 91% (08-08-23 @ 01:50)    PE:  Gen: tired, pale appearing   Resp: nl work of breathing. Lungs clear   Abd: Soft, appropriately tender. Mildly distended   : Bloody urine in caro tubing. Nl lochia   Ext: 2+ pitting edema to knee bilaterally     A/P:   38yo s/p pLTCS notable for EBL 1755 now with fever, tachycardia, low O2 sat in PACU. Patient pale appearing, edematous without other significant physical exam findings. Will begin treatment endometritis. Additional findings concerning for fluid overload/pulmonary edema vs sepsis vs acute blood loss anemia.     - STAT CXR performed, f/u results   - holding further IVF pending results of CXR   - Unasyn   - STAT CBC/BMP/Coags  - Cont monitoring UOP   - Cont close monitoring of VS     Lizette Avilez MD PGY4   d/w Dr. Baron
Patient seen and examined for low UOP.     Patient reports feeling much better s/p 2u pRBC, cryo currently running. She denies lightheadedness/dizziness/difficulty breathing/chest pain.     UOP improved to 33 cc and then 21 cc.     Will f/u CBC 1hr post cryoprecipitate    D/w Dr. Robert Morrow, PGY3
R4 Plan of Care     Labs and imaging reviewed. H/H continues to downtrend, 7.7/24.7. Plt 101. Fibrinogen 178.   CXR discussed with radiology, lungs clear.   VS remain stable with tachycardia to low 100s, O2 sat 92-96%.     Will transfuse 2U pRBC. Case reviewed with anesthesia team and will additionally transfuse 1u FFP.     Cont close monitoring     Lizette Avilez MD PGY4   d/w Dr. Baron
pt seen and evaluated, persisted low O2 sat - 92-95% following pLTCS EBL 1755 and 2u pRBC and 1u cryo transfusion this AM     reports feeling much better since this AM, has appetite, no difficulty breathing, chest pain.    Vitals:  T(F): 98.2 (08 Aug 2023 11:15), Max: 100.8 (08 Aug 2023 02:13)  HR: 90 (08 Aug 2023 15:00)  BP: 117/69 (08 Aug 2023 15:00)  RR: 16 (08 Aug 2023 15:00)  SpO2: 93% (08 Aug 2023 15:00) (88% - 100%)  temp max in last 48H T(F): , Max: 100.8 (08-08-23 @ 02:13)    Gen: NAD  CV: RRR, clear to auscultation all lung fields  Abd: Soft, nontender   Ext: b/l UE trace edema, b/l LE pitting edema to mid calf    c/f fluid overload given large volume infusions after elevated EBL  - will f/u repeat CXR (last at 3a clear)     d/w Dr. Robert Morrow, PGY3

## 2023-08-08 NOTE — PROGRESS NOTE ADULT - SUBJECTIVE AND OBJECTIVE BOX
Day 1 of Anesthesia Pain Management Service    SUBJECTIVE: Doing ok  Pain Scale Score:          [X] Refer to charted pain scores    THERAPY:  s/p   2  mg PF epidural morphine on 8\7\2023      MEDICATIONS  (STANDING):  acetaminophen     Tablet .. 975 milliGRAM(s) Oral <User Schedule>  ampicillin/sulbactam  IVPB 3 Gram(s) IV Intermittent every 6 hours  ampicillin/sulbactam  IVPB      diphtheria/tetanus/pertussis (acellular) Vaccine (Adacel) 0.5 milliLiter(s) IntraMuscular once  heparin   Injectable 5000 Unit(s) SubCutaneous two times a day  lactated ringers. 1000 milliLiter(s) (125 mL/Hr) IV Continuous <Continuous>  methylergonovine 0.2 milliGRAM(s) Oral every 4 hours  misoprostol 1000 MICROGram(s) Rectal once  morphine PF Epidural 2 milliGRAM(s) Epidural once  oxytocin Infusion 333.333 milliUNIT(s)/Min (1000 mL/Hr) IV Continuous <Continuous>    MEDICATIONS  (PRN):  diphenhydrAMINE 25 milliGRAM(s) Oral every 6 hours PRN Pruritus  lanolin Ointment 1 Application(s) Topical every 6 hours PRN Sore Nipples  magnesium hydroxide Suspension 30 milliLiter(s) Oral two times a day PRN Constipation  oxyCODONE    IR 5 milliGRAM(s) Oral every 3 hours PRN Moderate to Severe Pain (4-10)  oxyCODONE    IR 5 milliGRAM(s) Oral once PRN Moderate to Severe Pain (4-10)  simethicone 80 milliGRAM(s) Chew every 4 hours PRN Gas      OBJECTIVE:    Sedation:        	[X] Alert	 [ ] Drowsy	[ ] Arousable      [ ] Asleep       [ ] Unresponsive    Side Effects:	[X] None 	[ ] Nausea	[ ] Vomiting         [ ] Pruritus  		[ ] Weakness            [ ] Numbness	          [ ] Other:    Vital Signs Last 24 Hrs  T(C): 36.8 (08 Aug 2023 07:50), Max: 38.2 (08 Aug 2023 02:13)  T(F): 98.2 (08 Aug 2023 07:50), Max: 100.8 (08 Aug 2023 02:13)  HR: 87 (08 Aug 2023 07:50) (57 - 116)  BP: 138/82 (08 Aug 2023 07:50) (104/67 - 213/83)  BP(mean): 98 (08 Aug 2023 07:50) (85 - 102)  RR: 17 (08 Aug 2023 07:50) (16 - 17)  SpO2: 94% (08 Aug 2023 07:50) (88% - 100%)    Parameters below as of 08 Aug 2023 07:50  Patient On (Oxygen Delivery Method): room air  O2 Flow (L/min): 1.5      ASSESSMENT/ PLAN  [X] Patient to be transitioned to prn analgesics after 24 hours  [X] Pain management per primary service, pain service to sign off   [X]Documentation and Verification of current medications

## 2023-08-08 NOTE — PROGRESS NOTE ADULT - SUBJECTIVE AND OBJECTIVE BOX
OB Progress Note:  Delivery, POD#1    S: 38yo POD#1 s/p pLTCS 2/2 arrest of dilatation (EBL: 1755). Post operative course c/b transient hypoxia requiring 2.5L of O2 and low UOP. Her pain is well controlled. She is tolerating a regular diet and passing flatus. Denies N/V. Denies CP/SOB/lightheadedness/dizziness. Membreno in situ    O:   Vital Signs Last 24 Hrs  T(C): 37 (08 Aug 2023 04:05), Max: 38.2 (08 Aug 2023 02:13)  T(F): 98.6 (08 Aug 2023 04:05), Max: 100.8 (08 Aug 2023 02:13)  HR: 92 (08 Aug 2023 04:45) (57 - 116)  BP: 132/75 (08 Aug 2023 04:45) (104/67 - 213/83)  BP(mean): 98 (08 Aug 2023 04:45) (85 - 98)  RR: 16 (08 Aug 2023 04:45) (16 - 16)  SpO2: 95% (08 Aug 2023 04:45) (88% - 100%)    Parameters below as of 08 Aug 2023 04:45  Patient On (Oxygen Delivery Method): nasal cannula        Labs:  Blood type: B Positive  Rubella IgG: RPR:                           7.7<L>   19.46<H> >-----------< 101<L>    (  @ 02:29 )             24.7<L>                        9.9<L>   23.36<H> >-----------< 157    (  @ 22:42 )             32.1<L>                        10.5<L>   5.15 >-----------< 223    (  @ 02:05 )             33.7<L>    23 @ 02:29      139  |  107  |  12  ----------------------------<  114<H>  4.1   |  19<L>  |  1.01        Ca    8.0<L>      08 Aug 2023 02:29            PE:  General: NAD  Abdomen: Mildly distended, appropriately tender, incision c/d/i.  Extremities: No erythema, no pitting edema

## 2023-08-08 NOTE — PROGRESS NOTE ADULT - ATTENDING COMMENTS
38yo s/p CD, POD #1, with intrapartum and PPH, now s/p 2u rpbc and cryo, now improving UOP, labs stabilizing  on abx for suspected endometritis  wean O2 as tolerated 36yo s/p CD, POD #1, with intrapartum and PPH, now s/p 2u prbc and cryo, now improving UOP, labs stabilizing  on abx for suspected endometritis  wean O2 as tolerated  close obs  monitior uop  pain meds prn  adithya bermudez MD

## 2023-08-09 LAB
APTT BLD: 26.6 SEC — SIGNIFICANT CHANGE UP (ref 24.5–35.6)
BLD GP AB SCN SERPL QL: NEGATIVE — SIGNIFICANT CHANGE UP
FIBRINOGEN PPP-MCNC: 486 MG/DL — HIGH (ref 200–445)
HCT VFR BLD CALC: 27.4 % — LOW (ref 34.5–45)
HCT VFR BLD CALC: 31 % — LOW (ref 34.5–45)
HGB BLD-MCNC: 10.2 G/DL — LOW (ref 11.5–15.5)
HGB BLD-MCNC: 9.1 G/DL — LOW (ref 11.5–15.5)
INR BLD: 0.89 RATIO — SIGNIFICANT CHANGE UP (ref 0.85–1.18)
MCHC RBC-ENTMCNC: 25.1 PG — LOW (ref 27–34)
MCHC RBC-ENTMCNC: 25.3 PG — LOW (ref 27–34)
MCHC RBC-ENTMCNC: 32.9 GM/DL — SIGNIFICANT CHANGE UP (ref 32–36)
MCHC RBC-ENTMCNC: 33.2 GM/DL — SIGNIFICANT CHANGE UP (ref 32–36)
MCV RBC AUTO: 76.1 FL — LOW (ref 80–100)
MCV RBC AUTO: 76.2 FL — LOW (ref 80–100)
NRBC # BLD: 0 /100 WBCS — SIGNIFICANT CHANGE UP (ref 0–0)
NRBC # BLD: 0 /100 WBCS — SIGNIFICANT CHANGE UP (ref 0–0)
PLATELET # BLD AUTO: 101 K/UL — LOW (ref 150–400)
PLATELET # BLD AUTO: 108 K/UL — LOW (ref 150–400)
PROTHROM AB SERPL-ACNC: 9.8 SEC — SIGNIFICANT CHANGE UP (ref 9.5–13)
RBC # BLD: 3.6 M/UL — LOW (ref 3.8–5.2)
RBC # BLD: 4.07 M/UL — SIGNIFICANT CHANGE UP (ref 3.8–5.2)
RBC # FLD: 19.9 % — HIGH (ref 10.3–14.5)
RBC # FLD: 20 % — HIGH (ref 10.3–14.5)
RH IG SCN BLD-IMP: POSITIVE — SIGNIFICANT CHANGE UP
WBC # BLD: 14.66 K/UL — HIGH (ref 3.8–10.5)
WBC # BLD: 17.8 K/UL — HIGH (ref 3.8–10.5)
WBC # FLD AUTO: 14.66 K/UL — HIGH (ref 3.8–10.5)
WBC # FLD AUTO: 17.8 K/UL — HIGH (ref 3.8–10.5)

## 2023-08-09 RX ORDER — IBUPROFEN 200 MG
600 TABLET ORAL EVERY 6 HOURS
Refills: 0 | Status: DISCONTINUED | OUTPATIENT
Start: 2023-08-09 | End: 2023-08-11

## 2023-08-09 RX ORDER — HEPARIN SODIUM 5000 [USP'U]/ML
5000 INJECTION INTRAVENOUS; SUBCUTANEOUS EVERY 12 HOURS
Refills: 0 | Status: DISCONTINUED | OUTPATIENT
Start: 2023-08-09 | End: 2023-08-09

## 2023-08-09 RX ADMIN — Medication 600 MILLIGRAM(S): at 10:07

## 2023-08-09 RX ADMIN — Medication 600 MILLIGRAM(S): at 02:42

## 2023-08-09 RX ADMIN — Medication 600 MILLIGRAM(S): at 21:08

## 2023-08-09 RX ADMIN — AMPICILLIN SODIUM AND SULBACTAM SODIUM 200 GRAM(S): 250; 125 INJECTION, POWDER, FOR SUSPENSION INTRAMUSCULAR; INTRAVENOUS at 15:41

## 2023-08-09 RX ADMIN — HEPARIN SODIUM 5000 UNIT(S): 5000 INJECTION INTRAVENOUS; SUBCUTANEOUS at 05:20

## 2023-08-09 RX ADMIN — Medication 600 MILLIGRAM(S): at 20:38

## 2023-08-09 RX ADMIN — Medication 975 MILLIGRAM(S): at 05:50

## 2023-08-09 RX ADMIN — Medication 975 MILLIGRAM(S): at 05:20

## 2023-08-09 RX ADMIN — Medication 975 MILLIGRAM(S): at 12:31

## 2023-08-09 RX ADMIN — AMPICILLIN SODIUM AND SULBACTAM SODIUM 200 GRAM(S): 250; 125 INJECTION, POWDER, FOR SUSPENSION INTRAMUSCULAR; INTRAVENOUS at 03:28

## 2023-08-09 RX ADMIN — Medication 600 MILLIGRAM(S): at 15:40

## 2023-08-09 RX ADMIN — Medication 975 MILLIGRAM(S): at 19:14

## 2023-08-09 RX ADMIN — Medication 975 MILLIGRAM(S): at 13:01

## 2023-08-09 RX ADMIN — Medication 600 MILLIGRAM(S): at 16:10

## 2023-08-09 RX ADMIN — Medication 600 MILLIGRAM(S): at 09:37

## 2023-08-09 RX ADMIN — Medication 975 MILLIGRAM(S): at 18:44

## 2023-08-09 RX ADMIN — HEPARIN SODIUM 5000 UNIT(S): 5000 INJECTION INTRAVENOUS; SUBCUTANEOUS at 18:43

## 2023-08-09 RX ADMIN — Medication 600 MILLIGRAM(S): at 02:12

## 2023-08-09 RX ADMIN — AMPICILLIN SODIUM AND SULBACTAM SODIUM 200 GRAM(S): 250; 125 INJECTION, POWDER, FOR SUSPENSION INTRAMUSCULAR; INTRAVENOUS at 09:38

## 2023-08-09 NOTE — PROGRESS NOTE ADULT - SUBJECTIVE AND OBJECTIVE BOX
OB Progress Note:  Delivery, POD#2    S: 38yo POD#2 s/p pLTCS 2/2 arrest of dilatation, post operative course complicated by low urine output and hypoxia. Her pain is well controlled. She is tolerating a regular diet and passing flatus. Denies N/V. Denies CP/SOB/lightheadedness/dizziness.   She is ambulating without difficulty.   Voiding spontaneously.     O:   Vital Signs Last 24 Hrs  T(C): 36.9 (09 Aug 2023 00:32), Max: 37.1 (08 Aug 2023 07:10)  T(F): 98.4 (09 Aug 2023 00:32), Max: 98.8 (08 Aug 2023 07:10)  HR: 81 (09 Aug 2023 00:32) (80 - 96)  BP: 122/80 (09 Aug 2023 00:32) (103/61 - 138/82)  BP(mean): 90 (08 Aug 2023 16:30) (71 - 102)  RR: 18 (09 Aug 2023 00:32) (16 - 18)  SpO2: 94% (09 Aug 2023 00:32) (90% - 95%)    Parameters below as of 09 Aug 2023 00:32  Patient On (Oxygen Delivery Method): room air        Labs:  Blood type: B Positive  Rubella IgG: RPR: Negative                          10.2<L>   17.80<H> >-----------< 101<L>    (  @ 00:24 )             31.0<L>                        9.5<L>   15.26<H> >-----------< 84<L>    (  @ 13:54 )             28.4<L>                        10.2<L>   16.20<H> >-----------< 86<L>    (  @ 10:27 )             30.5<L>                        7.7<L>   19.46<H> >-----------< 101<L>    (  @ 02:29 )             24.7<L>                        9.9<L>   23.36<H> >-----------< 157    (  @ 22:42 )             32.1<L>                        10.5<L>   5.15 >-----------< 223    (  @ 02:05 )             33.7<L>    23 @ 10:27      137  |  105  |  16  ----------------------------<  88  4.1   |  20<L>  |  0.85    23 @ 02:29      139  |  107  |  12  ----------------------------<  114<H>  4.1   |  19<L>  |  1.01        Ca    7.8<L>      08 Aug 2023 10:27  Ca    8.0<L>      08 Aug 2023 02:29    TPro  4.5<L>  /  Alb  2.4<L>  /  TBili  0.5  /  DBili  x   /  AST  34  /  ALT  17  /  AlkPhos  94  23 @ 10:27          PE:  General: NAD  Abdomen: Mildly distended, appropriately tender, incision c/d/i.  Extremities: No erythema, no pitting edema

## 2023-08-09 NOTE — PROGRESS NOTE ADULT - ATTENDING COMMENTS
38yo POD#2 s/p pLTCS 2/2 arrest of dilatation, post operative course complicated by low urine output and hypoxia, now doing well  continue close monitoring  routine post op and postpartum care    Diane Al MD

## 2023-08-10 RX ADMIN — Medication 975 MILLIGRAM(S): at 18:00

## 2023-08-10 RX ADMIN — HEPARIN SODIUM 5000 UNIT(S): 5000 INJECTION INTRAVENOUS; SUBCUTANEOUS at 05:28

## 2023-08-10 RX ADMIN — Medication 600 MILLIGRAM(S): at 03:25

## 2023-08-10 RX ADMIN — Medication 600 MILLIGRAM(S): at 02:55

## 2023-08-10 RX ADMIN — Medication 600 MILLIGRAM(S): at 09:38

## 2023-08-10 RX ADMIN — Medication 975 MILLIGRAM(S): at 05:58

## 2023-08-10 RX ADMIN — Medication 975 MILLIGRAM(S): at 12:14

## 2023-08-10 RX ADMIN — Medication 600 MILLIGRAM(S): at 09:08

## 2023-08-10 RX ADMIN — Medication 600 MILLIGRAM(S): at 22:00

## 2023-08-10 RX ADMIN — Medication 600 MILLIGRAM(S): at 15:54

## 2023-08-10 RX ADMIN — Medication 600 MILLIGRAM(S): at 21:20

## 2023-08-10 RX ADMIN — Medication 975 MILLIGRAM(S): at 23:38

## 2023-08-10 RX ADMIN — Medication 975 MILLIGRAM(S): at 00:36

## 2023-08-10 RX ADMIN — HEPARIN SODIUM 5000 UNIT(S): 5000 INJECTION INTRAVENOUS; SUBCUTANEOUS at 18:00

## 2023-08-10 RX ADMIN — Medication 975 MILLIGRAM(S): at 00:06

## 2023-08-10 RX ADMIN — SIMETHICONE 80 MILLIGRAM(S): 80 TABLET, CHEWABLE ORAL at 21:20

## 2023-08-10 RX ADMIN — Medication 975 MILLIGRAM(S): at 05:28

## 2023-08-10 NOTE — PROVIDER CONTACT NOTE (OTHER) - ASSESSMENT
Pt has 4 fluid filled blisters under tape of IV site on left arm. IV removed and pt instructed to not apply anything to blisters or to try and pop them on her own.

## 2023-08-10 NOTE — PROGRESS NOTE ADULT - SUBJECTIVE AND OBJECTIVE BOX
OB Postpartum Note:  Delivery, POD#3    S: 36yo POD#3 s/p pLTCS 2/2 arrest of dilatation, post operative course complicated by low urine output and hypoxia, now resolved. Her pain is well controlled. She is tolerating a regular diet and passing flatus. Denies N/V. Denies CP/SOB/lightheadedness/dizziness. She is ambulating without difficulty.   Voiding spontaneously.     O:  Vitals:  Vital Signs Last 24 Hrs  T(C): 36.4 (10 Aug 2023 00:08), Max: 36.9 (09 Aug 2023 13:06)  T(F): 97.5 (10 Aug 2023 00:08), Max: 98.4 (09 Aug 2023 13:06)  HR: 80 (10 Aug 2023 00:08) (63 - 98)  BP: 109/74 (10 Aug 2023 00:08) (108/73 - 138/84)  BP(mean): --  RR: 18 (10 Aug 2023 00:08) (18 - 18)  SpO2: 94% (10 Aug 2023 00:08) (94% - 98%)    Parameters below as of 10 Aug 2023 00:08  Patient On (Oxygen Delivery Method): room air        MEDICATIONS  (STANDING):  acetaminophen     Tablet .. 975 milliGRAM(s) Oral <User Schedule>  diphtheria/tetanus/pertussis (acellular) Vaccine (Adacel) 0.5 milliLiter(s) IntraMuscular once  heparin   Injectable 5000 Unit(s) SubCutaneous two times a day  ibuprofen  Tablet. 600 milliGRAM(s) Oral every 6 hours  lactated ringers. 1000 milliLiter(s) (125 mL/Hr) IV Continuous <Continuous>  methylergonovine 0.2 milliGRAM(s) Oral every 4 hours  misoprostol 1000 MICROGram(s) Rectal once  oxytocin Infusion 333.333 milliUNIT(s)/Min (1000 mL/Hr) IV Continuous <Continuous>    MEDICATIONS  (PRN):  diphenhydrAMINE 25 milliGRAM(s) Oral every 6 hours PRN Pruritus  lanolin Ointment 1 Application(s) Topical every 6 hours PRN Sore Nipples  magnesium hydroxide Suspension 30 milliLiter(s) Oral two times a day PRN Constipation  oxyCODONE    IR 5 milliGRAM(s) Oral every 3 hours PRN Moderate to Severe Pain (4-10)  oxyCODONE    IR 5 milliGRAM(s) Oral once PRN Moderate to Severe Pain (4-10)  simethicone 80 milliGRAM(s) Chew every 4 hours PRN Gas      LABS:  Blood type: B Positive  Rubella IgG: RPR: Negative                          9.1<L>   14.66<H> >-----------< 108<L>    (  @ 11:08 )             27.4<L>                        10.2<L>   17.80<H> >-----------< 101<L>    (  @ 00:24 )             31.0<L>                        9.5<L>   15.26<H> >-----------< 84<L>    (  @ 13:54 )             28.4<L>                        10.2<L>   16.20<H> >-----------< 86<L>    (  @ 10:27 )             30.5<L>                        7.7<L>   19.46<H> >-----------< 101<L>    (  @ 02:29 )             24.7<L>                        9.9<L>   23.36<H> >-----------< 157    (  @ 22:42 )             32.1<L>    23 @ 10:27      137  |  105  |  16  ----------------------------<  88  4.1   |  20<L>  |  0.85    23 @ 02:29      139  |  107  |  12  ----------------------------<  114<H>  4.1   |  19<L>  |  1.01        Ca    7.8<L>      08 Aug 2023 10:27  Ca    8.0<L>      08 Aug 2023 02:29    TPro  4.5<L>  /  Alb  2.4<L>  /  TBili  0.5  /  DBili  x   /  AST  34  /  ALT  17  /  AlkPhos  94  23 @ 10:27          Physical exam:  Gen: NAD  Abdomen: Soft, nontender, no distension , firm uterine fundus at umbilicus.  Incision: Clean, dry, and intact   Pelvic: Normal lochia noted  Ext: No calf tenderness   OB Postpartum Note:  Delivery, POD#3    S: 36yo POD#3 s/p pLTCS 2/2 arrest of dilatation, post operative course complicated by low urine output and hypoxia, now resolved. Her pain is well controlled. Only complaint is small blisters on arm around the site of the adhesive tape for the IV. She is tolerating a regular diet and passing flatus. Denies N/V. Denies CP/SOB/lightheadedness/dizziness. She is ambulating without difficulty.   Voiding spontaneously.     O:  Vitals:  Vital Signs Last 24 Hrs  T(C): 36.4 (10 Aug 2023 00:08), Max: 36.9 (09 Aug 2023 13:06)  T(F): 97.5 (10 Aug 2023 00:08), Max: 98.4 (09 Aug 2023 13:06)  HR: 80 (10 Aug 2023 00:08) (63 - 98)  BP: 109/74 (10 Aug 2023 00:08) (108/73 - 138/84)  BP(mean): --  RR: 18 (10 Aug 2023 00:08) (18 - 18)  SpO2: 94% (10 Aug 2023 00:08) (94% - 98%)    Parameters below as of 10 Aug 2023 00:08  Patient On (Oxygen Delivery Method): room air        MEDICATIONS  (STANDING):  acetaminophen     Tablet .. 975 milliGRAM(s) Oral <User Schedule>  diphtheria/tetanus/pertussis (acellular) Vaccine (Adacel) 0.5 milliLiter(s) IntraMuscular once  heparin   Injectable 5000 Unit(s) SubCutaneous two times a day  ibuprofen  Tablet. 600 milliGRAM(s) Oral every 6 hours  lactated ringers. 1000 milliLiter(s) (125 mL/Hr) IV Continuous <Continuous>  methylergonovine 0.2 milliGRAM(s) Oral every 4 hours  misoprostol 1000 MICROGram(s) Rectal once  oxytocin Infusion 333.333 milliUNIT(s)/Min (1000 mL/Hr) IV Continuous <Continuous>    MEDICATIONS  (PRN):  diphenhydrAMINE 25 milliGRAM(s) Oral every 6 hours PRN Pruritus  lanolin Ointment 1 Application(s) Topical every 6 hours PRN Sore Nipples  magnesium hydroxide Suspension 30 milliLiter(s) Oral two times a day PRN Constipation  oxyCODONE    IR 5 milliGRAM(s) Oral every 3 hours PRN Moderate to Severe Pain (4-10)  oxyCODONE    IR 5 milliGRAM(s) Oral once PRN Moderate to Severe Pain (4-10)  simethicone 80 milliGRAM(s) Chew every 4 hours PRN Gas      LABS:  Blood type: B Positive  Rubella IgG: RPR: Negative                          9.1<L>   14.66<H> >-----------< 108<L>    (  @ 11:08 )             27.4<L>                        10.2<L>   17.80<H> >-----------< 101<L>    (  @ 00:24 )             31.0<L>                        9.5<L>   15.26<H> >-----------< 84<L>    (  @ 13:54 )             28.4<L>                        10.2<L>   16.20<H> >-----------< 86<L>    (  @ 10:27 )             30.5<L>                        7.7<L>   19.46<H> >-----------< 101<L>    (  @ 02:29 )             24.7<L>                        9.9<L>   23.36<H> >-----------< 157    (  @ 22:42 )             32.1<L>    23 @ 10:27      137  |  105  |  16  ----------------------------<  88  4.1   |  20<L>  |  0.85    23 @ 02:29      139  |  107  |  12  ----------------------------<  114<H>  4.1   |  19<L>  |  1.01        Ca    7.8<L>      08 Aug 2023 10:27  Ca    8.0<L>      08 Aug 2023 02:29    TPro  4.5<L>  /  Alb  2.4<L>  /  TBili  0.5  /  DBili  x   /  AST  34  /  ALT  17  /  AlkPhos  94  23 @ 10:27          Physical exam:  Gen: NAD  Abdomen: Soft, nontender, no distension , firm uterine fundus at umbilicus.  Incision: Clean, dry, and intact   Pelvic: Normal lochia noted  Ext: No calf tenderness

## 2023-08-10 NOTE — PROVIDER CONTACT NOTE (OTHER) - ACTION/TREATMENT ORDERED:
MD Zamorano made aware. MD states to reassess blisters in the AM and if they get worse a topical steroid may be ordered. No further orders at this time. Will continue to monitor.

## 2023-08-11 ENCOUNTER — TRANSCRIPTION ENCOUNTER (OUTPATIENT)
Age: 38
End: 2023-08-11

## 2023-08-11 VITALS
TEMPERATURE: 98 F | SYSTOLIC BLOOD PRESSURE: 122 MMHG | RESPIRATION RATE: 18 BRPM | HEART RATE: 82 BPM | OXYGEN SATURATION: 97 % | DIASTOLIC BLOOD PRESSURE: 79 MMHG

## 2023-08-11 PROCEDURE — 83605 ASSAY OF LACTIC ACID: CPT

## 2023-08-11 PROCEDURE — 71045 X-RAY EXAM CHEST 1 VIEW: CPT

## 2023-08-11 PROCEDURE — 85730 THROMBOPLASTIN TIME PARTIAL: CPT

## 2023-08-11 PROCEDURE — 59025 FETAL NON-STRESS TEST: CPT

## 2023-08-11 PROCEDURE — 36415 COLL VENOUS BLD VENIPUNCTURE: CPT

## 2023-08-11 PROCEDURE — 86850 RBC ANTIBODY SCREEN: CPT

## 2023-08-11 PROCEDURE — 86780 TREPONEMA PALLIDUM: CPT

## 2023-08-11 PROCEDURE — 85362 FIBRIN DEGRADATION PRODUCTS: CPT

## 2023-08-11 PROCEDURE — 85610 PROTHROMBIN TIME: CPT

## 2023-08-11 PROCEDURE — 36430 TRANSFUSION BLD/BLD COMPNT: CPT

## 2023-08-11 PROCEDURE — 85025 COMPLETE CBC W/AUTO DIFF WBC: CPT

## 2023-08-11 PROCEDURE — 86965 POOLING BLOOD PLATELETS: CPT

## 2023-08-11 PROCEDURE — 86900 BLOOD TYPING SEROLOGIC ABO: CPT

## 2023-08-11 PROCEDURE — 59050 FETAL MONITOR W/REPORT: CPT

## 2023-08-11 PROCEDURE — P9016: CPT

## 2023-08-11 PROCEDURE — 80053 COMPREHEN METABOLIC PANEL: CPT

## 2023-08-11 PROCEDURE — 80048 BASIC METABOLIC PNL TOTAL CA: CPT

## 2023-08-11 PROCEDURE — 86769 SARS-COV-2 COVID-19 ANTIBODY: CPT

## 2023-08-11 PROCEDURE — 85027 COMPLETE CBC AUTOMATED: CPT

## 2023-08-11 PROCEDURE — 86901 BLOOD TYPING SEROLOGIC RH(D): CPT

## 2023-08-11 PROCEDURE — P9012: CPT

## 2023-08-11 PROCEDURE — 86923 COMPATIBILITY TEST ELECTRIC: CPT

## 2023-08-11 PROCEDURE — 85384 FIBRINOGEN ACTIVITY: CPT

## 2023-08-11 PROCEDURE — C1765: CPT

## 2023-08-11 RX ORDER — SIMETHICONE 80 MG/1
1 TABLET, CHEWABLE ORAL
Qty: 0 | Refills: 0 | DISCHARGE
Start: 2023-08-11

## 2023-08-11 RX ORDER — IBUPROFEN 200 MG
1 TABLET ORAL
Qty: 0 | Refills: 0 | DISCHARGE
Start: 2023-08-11

## 2023-08-11 RX ORDER — ACETAMINOPHEN 500 MG
3 TABLET ORAL
Qty: 0 | Refills: 0 | DISCHARGE
Start: 2023-08-11

## 2023-08-11 RX ADMIN — Medication 600 MILLIGRAM(S): at 10:45

## 2023-08-11 RX ADMIN — Medication 975 MILLIGRAM(S): at 06:46

## 2023-08-11 RX ADMIN — Medication 975 MILLIGRAM(S): at 00:10

## 2023-08-11 RX ADMIN — Medication 975 MILLIGRAM(S): at 16:00

## 2023-08-11 RX ADMIN — Medication 600 MILLIGRAM(S): at 04:10

## 2023-08-11 RX ADMIN — Medication 600 MILLIGRAM(S): at 09:56

## 2023-08-11 RX ADMIN — Medication 975 MILLIGRAM(S): at 15:30

## 2023-08-11 RX ADMIN — Medication 600 MILLIGRAM(S): at 03:30

## 2023-08-11 RX ADMIN — HEPARIN SODIUM 5000 UNIT(S): 5000 INJECTION INTRAVENOUS; SUBCUTANEOUS at 06:10

## 2023-08-11 RX ADMIN — Medication 975 MILLIGRAM(S): at 06:10

## 2023-08-11 NOTE — DISCHARGE NOTE OB - CARE PROVIDER_API CALL
Damon Baron  Obstetrics and Gynecology  [address] [place] [address], [address]  [place] [address]  Phone: [phone]  Fax: [phone]  Follow Up Time: 2 weeks

## 2023-08-11 NOTE — PROGRESS NOTE ADULT - SUBJECTIVE AND OBJECTIVE BOX
OB Postpartum Note:  Delivery, POD#3    S: 38yo POD#4 s/p pLTCS 2/2 arrest of dilatation, post operative course complicated by low urine output and hypoxia, now resolved. Her pain is well controlled. She is tolerating a regular diet and passing flatus. Denies N/V. Denies CP/SOB/lightheadedness/dizziness. She is ambulating without difficulty.   Voiding spontaneously.     O:  Vitals:  Vital Signs Last 24 Hrs  T(C): 36.6 (10 Aug 2023 21:00), Max: 37.1 (10 Aug 2023 13:00)  T(F): 97.9 (10 Aug 2023 21:00), Max: 98.8 (10 Aug 2023 13:00)  HR: 86 (10 Aug 2023 21:) (70 - 86)  BP: 130/80 (10 Aug 2023 21:00) (114/78 - 130/80)  BP(mean): --  RR: 18 (10 Aug 2023 21:) (18 - 18)  SpO2: 95% (10 Aug 2023 21:) (94% - 95%)    Parameters below as of 10 Aug 2023 21:00  Patient On (Oxygen Delivery Method): room air        MEDICATIONS  (STANDING):  acetaminophen     Tablet .. 975 milliGRAM(s) Oral <User Schedule>  diphtheria/tetanus/pertussis (acellular) Vaccine (Adacel) 0.5 milliLiter(s) IntraMuscular once  heparin   Injectable 5000 Unit(s) SubCutaneous two times a day  ibuprofen  Tablet. 600 milliGRAM(s) Oral every 6 hours  lactated ringers. 1000 milliLiter(s) (125 mL/Hr) IV Continuous <Continuous>  methylergonovine 0.2 milliGRAM(s) Oral every 4 hours  misoprostol 1000 MICROGram(s) Rectal once  oxytocin Infusion 333.333 milliUNIT(s)/Min (1000 mL/Hr) IV Continuous <Continuous>    MEDICATIONS  (PRN):  diphenhydrAMINE 25 milliGRAM(s) Oral every 6 hours PRN Pruritus  lanolin Ointment 1 Application(s) Topical every 6 hours PRN Sore Nipples  magnesium hydroxide Suspension 30 milliLiter(s) Oral two times a day PRN Constipation  oxyCODONE    IR 5 milliGRAM(s) Oral once PRN Moderate to Severe Pain (4-10)  oxyCODONE    IR 5 milliGRAM(s) Oral every 3 hours PRN Moderate to Severe Pain (4-10)  simethicone 80 milliGRAM(s) Chew every 4 hours PRN Gas      LABS:  Blood type: B Positive  Rubella IgG: RPR: Negative                          9.1<L>   14.66<H> >-----------< 108<L>    (  @ 11:08 )             27.4<L>                        10.2<L>   17.80<H> >-----------< 101<L>    (  @ 00:24 )             31.0<L>                        9.5<L>   15.26<H> >-----------< 84<L>    (  @ 13:54 )             28.4<L>                        10.2<L>   16.20<H> >-----------< 86<L>    (  @ 10:27 )             30.5<L>    23 @ 10:27      137  |  105  |  16  ----------------------------<  88  4.1   |  20<L>  |  0.85        Ca    7.8<L>      08 Aug 2023 10:27    TPro  4.5<L>  /  Alb  2.4<L>  /  TBili  0.5  /  DBili  x   /  AST  34  /  ALT  17  /  AlkPhos  94  23 @ 10:27    Physical exam:  Gen: NAD  Abdomen: Soft, nontender, no distension , firm uterine fundus at umbilicus.  Incision: Clean, dry, and intact   Pelvic: Normal lochia noted  Ext: No calf tenderness

## 2023-08-11 NOTE — DISCHARGE NOTE OB - MEDICATION SUMMARY - MEDICATIONS TO TAKE
I will START or STAY ON the medications listed below when I get home from the hospital:    ibuprofen 600 mg oral tablet  -- 1 tab(s) by mouth every 6 hours  -- Indication: For  delivery delivered    acetaminophen 325 mg oral tablet  -- 3 tab(s) by mouth every 6 hours  -- Indication: For  delivery delivered    Prenatal 1 oral capsule  -- 1 cap(s) by mouth once a day  -- Indication: For  delivery delivered    simethicone 80 mg oral tablet, chewable  -- 1 tab(s) by mouth every 4 hours As needed Gas  -- Indication: For  delivery delivered

## 2023-08-11 NOTE — DISCHARGE NOTE OB - HOSPITAL COURSE
delivery complicated by uterine atony and uterine extension. pt was treated with methergine, cytotec and a methergine series. she received 2U PRBCs postop with stable labs thereafter. postop fever of 38.2 treated with unasyn. pt discontinued abx after 24hr. pt remained afebrile thereafter. she was discharged home on pod4 in stable condition.

## 2023-08-11 NOTE — PROGRESS NOTE ADULT - ATTENDING COMMENTS
pt seen and examined. overall doing well. no longer febrile. stable to dc home. reviewed precautions. f/u in office in two weeks.    anabelle gracia

## 2023-08-11 NOTE — DISCHARGE NOTE OB - PATIENT PORTAL LINK FT
You can access the FollowMyHealth Patient Portal offered by [facility] by registering at the following website: [email or web address]. By joining [facility]’s FollowMyHealth portal, you will also be able to view your health information using other applications (apps) compatible with our system.

## 2023-08-11 NOTE — PROGRESS NOTE ADULT - ASSESSMENT
A/P: 38yo POD#1 s/p pLTCS (EBL: 1755). Post operative course c/b hypoxia, low urine output, and feveres c/f anemia vs sepsis vs PE vs acute intraabdominal bleed. Patient currently on 2.5L NC. Otherwise hemodynamically and clinically stable    #hypoxia, low urine output  -H/H: 33.7->>24.7->2upRBC  -Plt: 223->>101  -Fibrinogen: 186->176->1uc cryo  -CBC/Coags/CMP 1 hour post transfusion  -Consider weaning off O2 after blood    #endometritis  -c/w Unasyn (8/8-)  -WBC: 5.15->23.36->19.46  -Monitor fever curve, WBC    #post partum  - Continue regular diet.  - Increase ambulation.  - Continue tylenol, oxycodone PRN for pain control.  -Motrin currently being held for patient's EL Zamorano PGY3
A/P: 38yo POD#4 s/p pLTCS (EBL: 1755). Post operative course c/b hypoxia, low urine output now resolved. Patient currently on RA. Otherwise hemodynamically and clinically stable    #hypoxia, low urine output  -H/H: 33.7->>24.7->2upRBC->>27.4  -Plt: 223->>86->>101->108  -Fibrinogen: 186->176->1uc cryo->305->486    #endometritis  -s/p Unasyn (8/8-8/9)  -WBC: 5.15->23.36->19.46->>14.66  -Monitor fever curve, WBC    #post partum  - Continue regular diet.  - Increase ambulation.  - Continue tylenol, motrin, oxycodone PRN for pain control.    LETICIA Zamorano PGY3    
A/P: 36yo POD#2 s/p pLTCS (EBL: 1755). Post operative course c/b hypoxia, low urine output now resolved. Patient currently onRA. Otherwise hemodynamically and clinically stable    #hypoxia, low urine output  -H/H: 33.7->>24.7->2upRBC->>31  -Plt: 223->>86->>101  -Fibrinogen: 186->176->1uc cryo->305  -f/u AM labs    #endometritis  -c/w Unasyn (8/8-)  -WBC: 5.15->23.36->19.46->>17.8  -Monitor fever curve, WBC    #post partum  - Continue regular diet.  - Increase ambulation.  - Continue tylenol, motrin, oxycodone PRN for pain control.      LETICIA Zamorano PGY3  
A/P: 38yo POD#3 s/p pLTCS (EBL: 1755). Post operative course c/b hypoxia, low urine output now resolved. Patient currently on RA. Otherwise hemodynamically and clinically stable    #hypoxia, low urine output  -H/H: 33.7->>24.7->2upRBC->>27.4  -Plt: 223->>86->>101->108  -Fibrinogen: 186->176->1uc cryo->305->486    #endometritis  -s/p Unasyn (8/8-8/9)  -WBC: 5.15->23.36->19.46->>14.66  -Monitor fever curve, WBC    #post partum  - Continue regular diet.  - Increase ambulation.  - Continue tylenol, motrin, oxycodone PRN for pain control.    LETICIA Zamorano PGY3

## 2023-08-21 ENCOUNTER — APPOINTMENT (OUTPATIENT)
Dept: OBGYN | Facility: CLINIC | Age: 38
End: 2023-08-21
Payer: COMMERCIAL

## 2023-08-21 VITALS
SYSTOLIC BLOOD PRESSURE: 112 MMHG | WEIGHT: 124 LBS | DIASTOLIC BLOOD PRESSURE: 77 MMHG | HEART RATE: 80 BPM | HEIGHT: 63 IN | BODY MASS INDEX: 21.97 KG/M2

## 2023-08-21 PROCEDURE — 0503F POSTPARTUM CARE VISIT: CPT

## 2023-08-22 NOTE — END OF VISIT
[FreeTextEntry3] : I, Yaw Rahman, acted as a scribe on behalf of Dr. Damon Baron on 08/21/2023 .  All medical entries made by the scribe were at my, Dr. Daomn Baron's, direction and personally dictated by me on 08/21/2023. I have reviewed the chart and agree that the record accurately reflects my personal performance of the history, physical exam, assessment and plan. I have also personally directed, reviewed, and agreed with the chart.

## 2023-08-22 NOTE — HISTORY OF PRESENT ILLNESS
[Delivery Date: ___] : on [unfilled] [Primary C/S] : delivered by  section [Male] : Delivery History: baby boy [Breastfeeding] : currently nursing [None] : No associated symptoms are reported [Clean/Dry/Intact] : clean, dry and intact [Healed] : healed [Not Done] : Examination of breasts not done [Doing Well] : is doing well [No Sign of Infection] : is showing no signs of infection [Excellent Pain Control] : has excellent pain control [Chills] : no chills [Fever] : no fever [Nausea] : no nausea [Erythema] : not erythematous [Swelling] : not swollen [Dehiscence] : not dehisced [de-identified] : full-term pLTCS 8/7/23 by JS for category II FHR tracing, boy 7.4lbs.  Intraop extension noted. Postpartum course significant for uterine atony, uterine extension, and anemia; treated with methergine, Cytotec, 2uPRBCs, 1u cryo. Postop fever (38.2C) was treated with Unasyn. Discharged POD 4.  [de-identified] : Breastfeeding. Denies mastitis sxs. Denies ppd sxs. Reports normal bowel/bladder fxn. No complaints. [de-identified] : abd soft, nt, nd [de-identified] : 38 yo s/p C/S, stable. no mastitis/depressive sx [de-identified] : reviewed mastitis and PPD precautions, pelvic rest, rto 4 wks for full PPV

## 2023-09-22 ENCOUNTER — APPOINTMENT (OUTPATIENT)
Dept: OBGYN | Facility: CLINIC | Age: 38
End: 2023-09-22
Payer: COMMERCIAL

## 2023-09-22 VITALS
OXYGEN SATURATION: 98 % | DIASTOLIC BLOOD PRESSURE: 79 MMHG | BODY MASS INDEX: 21.97 KG/M2 | HEIGHT: 63 IN | SYSTOLIC BLOOD PRESSURE: 125 MMHG | HEART RATE: 81 BPM | WEIGHT: 124 LBS

## 2023-09-22 DIAGNOSIS — Z30.09 ENCOUNTER FOR OTHER GENERAL COUNSELING AND ADVICE ON CONTRACEPTION: ICD-10-CM

## 2023-09-22 PROCEDURE — 0503F POSTPARTUM CARE VISIT: CPT

## 2023-10-17 RX ORDER — NORETHINDRONE 0.35 MG/1
0.35 TABLET ORAL DAILY
Qty: 3 | Refills: 1 | Status: ACTIVE | COMMUNITY
Start: 2023-09-22 | End: 1900-01-01

## 2023-11-03 ENCOUNTER — APPOINTMENT (OUTPATIENT)
Dept: INTERNAL MEDICINE | Facility: CLINIC | Age: 38
End: 2023-11-03
Payer: COMMERCIAL

## 2023-11-03 VITALS
SYSTOLIC BLOOD PRESSURE: 110 MMHG | OXYGEN SATURATION: 98 % | RESPIRATION RATE: 14 BRPM | HEIGHT: 63 IN | HEART RATE: 73 BPM | TEMPERATURE: 97.3 F | BODY MASS INDEX: 21.09 KG/M2 | WEIGHT: 119 LBS | DIASTOLIC BLOOD PRESSURE: 80 MMHG

## 2023-11-03 DIAGNOSIS — Z00.00 ENCOUNTER FOR GENERAL ADULT MEDICAL EXAMINATION W/OUT ABNORMAL FINDINGS: ICD-10-CM

## 2023-11-03 PROCEDURE — 99395 PREV VISIT EST AGE 18-39: CPT

## 2023-11-06 LAB
25(OH)D3 SERPL-MCNC: 34.5 NG/ML
ALBUMIN SERPL ELPH-MCNC: 5.3 G/DL
ALP BLD-CCNC: 90 U/L
ALT SERPL-CCNC: 21 U/L
ANION GAP SERPL CALC-SCNC: 14 MMOL/L
AST SERPL-CCNC: 19 U/L
BASOPHILS # BLD AUTO: 0.05 K/UL
BASOPHILS NFR BLD AUTO: 0.9 %
BILIRUB SERPL-MCNC: 0.8 MG/DL
BUN SERPL-MCNC: 22 MG/DL
CALCIUM SERPL-MCNC: 10.3 MG/DL
CHLORIDE SERPL-SCNC: 102 MMOL/L
CHOLEST SERPL-MCNC: 164 MG/DL
CO2 SERPL-SCNC: 23 MMOL/L
CREAT SERPL-MCNC: 0.7 MG/DL
EGFR: 115 ML/MIN/1.73M2
EOSINOPHIL # BLD AUTO: 0.08 K/UL
EOSINOPHIL NFR BLD AUTO: 1.5 %
ESTIMATED AVERAGE GLUCOSE: 103 MG/DL
GLUCOSE SERPL-MCNC: 70 MG/DL
HBA1C MFR BLD HPLC: 5.2 %
HCT VFR BLD CALC: 42.3 %
HDLC SERPL-MCNC: 78 MG/DL
HGB BLD-MCNC: 12.6 G/DL
IMM GRANULOCYTES NFR BLD AUTO: 0.2 %
LDLC SERPL CALC-MCNC: 75 MG/DL
LYMPHOCYTES # BLD AUTO: 1.45 K/UL
LYMPHOCYTES NFR BLD AUTO: 27.4 %
MAN DIFF?: NORMAL
MCHC RBC-ENTMCNC: 20.2 PG
MCHC RBC-ENTMCNC: 29.8 GM/DL
MCV RBC AUTO: 67.9 FL
MONOCYTES # BLD AUTO: 0.32 K/UL
MONOCYTES NFR BLD AUTO: 6 %
NEUTROPHILS # BLD AUTO: 3.39 K/UL
NEUTROPHILS NFR BLD AUTO: 64 %
NONHDLC SERPL-MCNC: 86 MG/DL
PLATELET # BLD AUTO: 263 K/UL
POTASSIUM SERPL-SCNC: 4.5 MMOL/L
PROT SERPL-MCNC: 8 G/DL
RBC # BLD: 6.23 M/UL
RBC # FLD: 15.5 %
SODIUM SERPL-SCNC: 139 MMOL/L
TRIGL SERPL-MCNC: 49 MG/DL
TSH SERPL-ACNC: 0.74 UIU/ML
WBC # FLD AUTO: 5.3 K/UL

## 2023-12-18 NOTE — OB PROVIDER LABOR PROGRESS NOTE - ASSESSMENT
New antibiotic sent to her pharmacy   The patient is a 38 y/o  @ 40.3 weeks admitted elective IOL  -will transition to pitocin for augmentation  -cervical balloon removed without incident    d/w Dr. Tod Irene NP The patient is a 38 y/o  @ 40.3 weeks admitted elective IOL  -will transition to pitocin for augmentation  -cervical balloon removed without incident    d/w Dr. Tod Irene NP    OB Attg:  Pt seen and assessed. I agree with above assessment and plan. Expectant vaginal delivery.  KAROLINA Baron MD

## 2024-01-30 ENCOUNTER — APPOINTMENT (OUTPATIENT)
Dept: OBGYN | Facility: CLINIC | Age: 39
End: 2024-01-30

## 2024-03-14 ENCOUNTER — APPOINTMENT (OUTPATIENT)
Dept: OBGYN | Facility: CLINIC | Age: 39
End: 2024-03-14
Payer: COMMERCIAL

## 2024-03-14 VITALS
WEIGHT: 117 LBS | SYSTOLIC BLOOD PRESSURE: 127 MMHG | BODY MASS INDEX: 20.73 KG/M2 | DIASTOLIC BLOOD PRESSURE: 79 MMHG | HEIGHT: 63 IN

## 2024-03-14 DIAGNOSIS — Z01.419 ENCOUNTER FOR GYNECOLOGICAL EXAMINATION (GENERAL) (ROUTINE) W/OUT ABNORMAL FINDINGS: ICD-10-CM

## 2024-03-14 DIAGNOSIS — Z11.51 ENCOUNTER FOR SCREENING FOR HUMAN PAPILLOMAVIRUS (HPV): ICD-10-CM

## 2024-03-14 PROCEDURE — 99395 PREV VISIT EST AGE 18-39: CPT

## 2024-03-14 NOTE — PHYSICAL EXAM
[Alert] : alert [Appropriately responsive] : appropriately responsive [No Acute Distress] : no acute distress [Soft] : soft [Non-tender] : non-tender [Non-distended] : non-distended [No HSM] : No HSM [No Mass] : no mass [No Lesions] : no lesions [Examination Of The Breasts] : a normal appearance [Oriented x3] : oriented x3 [No Masses] : no breast masses were palpable [Labia Majora] : normal [Labia Minora] : normal [Normal] : normal [Uterine Adnexae] : normal

## 2024-03-17 PROBLEM — Z01.419 ENCOUNTER FOR CERVICAL PAP SMEAR WITH PELVIC EXAM: Status: ACTIVE | Noted: 2024-03-14

## 2024-03-17 LAB — HPV HIGH+LOW RISK DNA PNL CVX: NOT DETECTED

## 2024-03-17 NOTE — HISTORY OF PRESENT ILLNESS
[FreeTextEntry1] : 39 y/o  LMP 24 pt presenting for annual exam. Last seen for her postpartum visit in 2023. She had a  and required a blood transfusion post-operatively. Pt was prescribed POP. Doing well, still breastfeeding. She had her first period since delivery about 2 weeks ago. Not using OCP, using withdrawal method.   SocHx: she works as a pharmacist at [employer]. Her baby is now 7 months old.   ObHx: c/s x1 (2023) PSHx: c/s x1  FamHx: breast cancer (MGM, dx in her 70s, ) NKDA

## 2024-03-17 NOTE — END OF VISIT
[FreeTextEntry2] : I, Mei Audi, acted as a scribe on behalf of Dr. Diane Al on 03/14/2024 .  All medical entries made by the scribe were at my, Dr. Diane Al, direction and personally dictated by me on 03/14/2024. I have reviewed the chart and agree that the record accurately reflects my personal performance of the history, physical exam, assessment and plan. I have also personally directed, reviewed, and agreed with the chart.

## 2024-03-18 ENCOUNTER — TRANSCRIPTION ENCOUNTER (OUTPATIENT)
Age: 39
End: 2024-03-18

## 2024-03-18 LAB — CYTOLOGY CVX/VAG DOC THIN PREP: NORMAL

## 2025-01-10 ENCOUNTER — NON-APPOINTMENT (OUTPATIENT)
Age: 40
End: 2025-01-10

## 2025-04-04 ENCOUNTER — APPOINTMENT (OUTPATIENT)
Dept: OBGYN | Facility: CLINIC | Age: 40
End: 2025-04-04

## 2025-04-07 ENCOUNTER — APPOINTMENT (OUTPATIENT)
Dept: OBGYN | Facility: CLINIC | Age: 40
End: 2025-04-07

## 2025-05-05 NOTE — ED ADULT NURSE NOTE - NS ED NURSE DISCH DISPOSITION
Have you been to the ER, urgent care clinic since your last visit?  Hospitalized since your last visit?   NO    Have you seen or consulted any other health care providers outside our system since your last visit?   NO      “Have you had a colorectal cancer screening such as a colonoscopy/FIT/Cologuard?    NO    No colonoscopy on file  No cologuard on file  No FIT/FOBT on file   No flexible sigmoidoscopy on file           Discharged